# Patient Record
Sex: FEMALE | Race: WHITE | Employment: OTHER | URBAN - METROPOLITAN AREA
[De-identification: names, ages, dates, MRNs, and addresses within clinical notes are randomized per-mention and may not be internally consistent; named-entity substitution may affect disease eponyms.]

---

## 2017-01-03 ENCOUNTER — OFFICE VISIT (OUTPATIENT)
Dept: ONCOLOGY | Age: 77
End: 2017-01-03

## 2017-01-03 VITALS
RESPIRATION RATE: 18 BRPM | SYSTOLIC BLOOD PRESSURE: 155 MMHG | DIASTOLIC BLOOD PRESSURE: 58 MMHG | HEIGHT: 62 IN | WEIGHT: 251.6 LBS | HEART RATE: 71 BPM | OXYGEN SATURATION: 94 % | BODY MASS INDEX: 46.3 KG/M2 | TEMPERATURE: 96.6 F

## 2017-01-03 DIAGNOSIS — C54.1 ENDOMETRIAL CANCER (HCC): Primary | ICD-10-CM

## 2017-01-03 NOTE — MR AVS SNAPSHOT
Visit Information Date & Time Provider Department Dept. Phone Encounter #  
 1/3/2017 11:15 AM MD Jackie Lanza [facility] [phone] Your Appointments 5/16/2017 11:15 AM  
Office Visit with MD Jackie Lanza [facility] [address] [facility]) Appt Note: 4 mo f/u  
 One [address] Upcoming Health Maintenance Date Due HEMOGLOBIN A1C Q6M 1940 LIPID PANEL Q1 1940 FOOT EXAM Q1 3/8/1952 MICROALBUMIN Q1 3/8/1952 EYE EXAM RETINAL OR DILATED Q1 3/8/1952 DTaP/Tdap/Td series (1 - Tdap) 3/8/1963 FOBT Q 1 YEAR AGE 50-75 3/8/1992 ZOSTER VACCINE AGE 60> 3/8/2002 GLAUCOMA SCREENING Q2Y 3/8/2007 OSTEOPOROSIS SCREENING (DEXA) 3/8/2007 Pneumococcal 65+ High/Highest Risk (1 of 2 - PCV13) 3/8/2007 MEDICARE YEARLY EXAM 3/8/2007 INFLUENZA AGE 9 TO ADULT 8/1/2016 Allergies as of 1/3/2017  Review Complete On: 1/3/2017 By: Gina Stevenson MD  
  
 Severity Noted Reaction Type Reactions Sulfa (Sulfonamide Antibiotics)  06/18/2015    Rash Current Immunizations  Never Reviewed No immunizations on file. Not reviewed this visit You Were Diagnosed With   
  
 Codes Comments Endometrial cancer ([facility] [address]    -  Primary ICD-10-CM: C54.1 ICD-9-CM: 182. 0 Vitals BP Pulse Temp Resp Height(growth percentile) Weight(growth percentile) 155/58 (BP 1 Location: Right arm, BP Patient Position: Sitting) 71 96.6 °F (35.9 °C) (Oral) 18 5' 2\" (1.575 m) 251 lb 9.6 oz (114.1 kg) SpO2 BMI OB Status Smoking Status 94% 46.02 kg/m2 Hysterectomy Never Smoker BMI and BSA Data Body Mass Index Body Surface Area 46.02 kg/m 2 2.23 m 2 Preferred Pharmacy Pharmacy Name Phone [address] [facility]. [facility] [phone] Your Updated Medication List  
  
   
This list is accurate as of: 1/3/17 12:13 PM.  Always use your most recent med list.  
  
  
  
  
 atenolol 25 mg tablet Commonly known as:  TENORMIN Take 25 mg by mouth daily. CALTRATE 600 + D 600 mg (1,500 mg)-800 unit iMega Generic drug:  calcium carbonate-vitamin D3 Take  by Mouth Once a Day. CALTRATE 600+D PLUS MINERALS 600 mg calcium- 400 unit Tab Generic drug:  Calcium Carbonate-Vit D3-Min Take  by mouth. * VOLTAREN 1 % Gel Generic drug:  diclofenac * diclofenac EC 75 mg EC tablet Commonly known as:  VOLTAREN  
  
 furosemide 40 mg tablet Commonly known as:  LASIX  
  
 glimepiride 2 mg tablet Commonly known as:  AMARYL  
  
 hydroCHLOROthiazide 12.5 mg capsule Commonly known as:  MICROZIDE  
12.5 mg.  
  
 ketoconazole 2 % shampoo Commonly known as:  NIZORAL Apply  to affected area daily as needed for Itching. losartan-hydroCHLOROthiazide 100-25 mg per tablet Commonly known as:  HYZAAR  
  
 magnesium 250 mg Tab Take  by mouth.  
  
 metFORMIN 500 mg Tg24 24 hour tablet Commonly known asKelleen [place] [facility] Take  by mouth. omeprazole 20 mg capsule Commonly known as:  PRILOSEC  
  
 potassium chloride SR 10 mEq tablet Commonly known as:  KLOR-CON 10 Take 20 mEq by mouth two (2) times a day. PRENATAL GUMMY 400 mcg-35 mg -25 mg-5 mg Chew Generic drug:  PNV62-FA-om3-dha-epa-fish oil Take  by mouth. therapeutic multivitamin-minerals tablet Commonly known as:  THERAGRAN-M Take 1 Tab by Mouth Once a Day. For hair and nail * Notice: This list has 2 medication(s) that are the same as other medications prescribed for you. Read the directions carefully, and ask your doctor or other care provider to review them with you. Introducing [facility] & [facility]! Dear Presley Cleaves: Thank you for requesting a [employer] account. Our records indicate that you already have an active [employer] account. You can access your account anytime at [email or web address] Did you know that you can access your hospital and ER discharge instructions at any time in [employer]? You can also review all of your test results from your hospital stay or ER visit. Additional Information If you have questions, please visit the Frequently Asked Questions section of the [employer] website at [email or web address] Remember, NeoAccel is NOT to be used for urgent needs. For medical emergencies, dial 911. Now available from your iPhone and Android! Please provide this summary of care documentation to your next provider. Your primary care clinician is listed as Nicole Johnson. If you have any questions after today's visit, please call [phone].

## 2017-01-03 NOTE — PROGRESS NOTES
[facility]  One [facility], [address], [address] [facility]   [phone]  Parveen Fitzgerald DO      Patient ID:  Name: Franca Villarreal  MRM: [id number]  : 1940/76 y.o. Date: 1/3/2017    SUBJECTIVE:    This is a 78 y.o.   female with a diagnosis of Stage IAG1 Endometrial Cancer following a robotic assisted TLH and BSO with cystoscopy 7/2/15. Pt is here today for a 4 month f/u. Patient states that approximatley a week ago she had frequent urination and urgency that has subsided. Patient followed up with Dr. Leena Will but has yet to make a f/u appointment due to her  having to have surgery. Denies Vaginal bleeding, change in vaginal discharge, new abdominopelvic pain, change in bladder/bowel habits      Her pathology revealed: 7/2/15      Imaging:  CT 11/25/15  Impression:      1. There is a fat containing umbilical hernia measuring approximately 3.9 cm in diameter. This has a neck measuring 2.6 cm in diameter. There is no protrusion of bowel into this hernia. No other abdominal wall hernia is evident. 2. Fatty infiltration of the liver. 3. Status post cholecystectomy. 4. A nonobstructing stone is noted in the lower pole of the left renal collecting system. 5. Colonic diverticulosis without evidence of acute diverticulitis. 6. Status post hysterectomy and bilateral oophorectomies. 7. The appendix is not visualized and is also presumed surgically absent. Medications:     Current Outpatient Prescriptions on File Prior to Visit   Medication Sig Dispense Refill    calcium carbonate-vitamin D3 (CALTRATE 600 + D) 600 mg (1,500 mg)-800 unit chew Take  by Mouth Once a Day.  hydrochlorothiazide (MICROZIDE) 12.5 mg capsule 12.5 mg.      therapeutic multivitamin-minerals (THERAGRAN-M) tablet Take 1 Tab by Mouth Once a Day.  For hair and nail      glimepiride (AMARYL) 2 mg tablet   0    furosemide (LASIX) 40 mg tablet   0    PNV62-FA-om3-dha-epa-fish oil (PRENATAL GUMMY) 400 mcg-35 mg -25 mg-5 mg chew Take  by mouth.  Calcium Carbonate-Vit D3-Min (CALTRATE 600+D PLUS MINERALS) 600 mg calcium- 400 unit tab Take  by mouth.  diclofenac EC (VOLTAREN) 75 mg EC tablet   0    VOLTAREN 1 % topical gel   0    losartan-hydrochlorothiazide (HYZAAR) 100-25 mg per tablet   0    omeprazole (PRILOSEC) 20 mg capsule   0    atenolol (TENORMIN) 25 mg tablet Take 25 mg by mouth daily.  magnesium 250 mg tab Take  by mouth.  potassium chloride SR (KLOR-CON 10) 10 mEq tablet Take 20 mEq by mouth two (2) times a day.  metFORMIN (GLUMETZA ER) 500 mg TG24 24 hour tablet Take  by mouth.  ketoconazole (NIZORAL) 2 % shampoo Apply  to affected area daily as needed for Itching. No current facility-administered medications on file prior to visit. Allergies: Allergies   Allergen Reactions    Sulfa (Sulfonamide Antibiotics) Rash       OBJECTIVE:    Vitals:   Visit Vitals    /58 (BP 1 Location: Right arm, BP Patient Position: Sitting)    Pulse 71    Temp 96.6 °F (35.9 °C) (Oral)    Resp 18    Ht 5' 2\" (1.575 m)    Wt 114.1 kg (251 lb 9.6 oz)    SpO2 94%    BMI 46.02 kg/m2       Physical Examination:    General:  alert, cooperative, no distress   Abdomen: soft, bowel sounds active, non-tender   Incision: healing well, small bulge above umblicus   Pelvic: NEFG, Spec exam revealed vaginal cuff intact, BME revealed vaginal cuff intact, nontender   Rectal: not done   Extremity:   extremities normal, atraumatic, no cyanosis or edema     IMPRESSION/PLAN:  1. Stage IAG1 endometrial cancer, who is clinically NEREYDA   -reviewed signs/symptoms of recurrence   -reviewed surveillance strategy   -will plan for f/u in  4 months   -all of patients questions/concerns addressed    2.  Urinary Incontinence   -treated by dr. Janak Hughes   -encouraged her to schedule f/u          I will see the patient back in 4 month(s). The patient is advised to call our office with any problems or concerns. Total time spent was 25 minutes regarding diagnosis of endometrial cancer and >50% of this time was spent counseling and coordinating care.     Tiny Mail DO  Gynecologic Oncology  1/3/2017/10:03 AM

## 2017-05-16 ENCOUNTER — OFFICE VISIT (OUTPATIENT)
Dept: ONCOLOGY | Age: 77
End: 2017-05-16

## 2017-05-16 VITALS
SYSTOLIC BLOOD PRESSURE: 151 MMHG | OXYGEN SATURATION: 95 % | TEMPERATURE: 97.6 F | HEART RATE: 68 BPM | BODY MASS INDEX: 44.26 KG/M2 | RESPIRATION RATE: 18 BRPM | DIASTOLIC BLOOD PRESSURE: 66 MMHG | WEIGHT: 242 LBS

## 2017-05-16 DIAGNOSIS — Z85.42 HISTORY OF ENDOMETRIAL CANCER: Primary | ICD-10-CM

## 2017-05-16 RX ORDER — ALLOPURINOL 100 MG/1
300 TABLET ORAL DAILY
Refills: 0 | COMMUNITY
Start: 2017-04-24

## 2017-05-16 NOTE — PROGRESS NOTES
[facility]  [address] [place] [address], [address], [address] [place] [address] 261 [phone]  Guilherme Thompson DO      Patient ID:  Name: Brodie Morgan  MRM: [id number]  : 1940/77 y.o. Date: 2017    SUBJECTIVE:    This is a 78 y.o.   female with a diagnosis of Stage IAG1 Endometrial Cancer following a robotic assisted TLH and BSO with cystoscopy 7/2/15. Pt is here today for a 4 month f/u. Patient denies any Gyn symptoms. Patient specifically denies any abnormal vaginal bleeding, vaginal discharge, or vaginal irritation. Patient also denies abdominal pain, pelvic pain, or abdominal bloating. Patient is voiding without difficulty and bowel movements are regular. Recently diagnosed with kidney stone and admitted to hospital s/p stent placement        Her pathology revealed: 7/2/15      Imaging:  CT 11/25/15  Impression:      1. There is a fat containing umbilical hernia measuring approximately 3.9 cm in diameter. This has a neck measuring 2.6 cm in diameter. There is no protrusion of bowel into this hernia. No other abdominal wall hernia is evident. 2. Fatty infiltration of the liver. 3. Status post cholecystectomy. 4. A nonobstructing stone is noted in the lower pole of the left renal collecting system. 5. Colonic diverticulosis without evidence of acute diverticulitis. 6. Status post hysterectomy and bilateral oophorectomies. 7. The appendix is not visualized and is also presumed surgically absent. Medications:     Current Outpatient Prescriptions on File Prior to Visit   Medication Sig Dispense Refill    calcium carbonate-vitamin D3 (CALTRATE 600 + D) 600 mg (1,500 mg)-800 unit chew Take  by Mouth Once a Day.  hydrochlorothiazide (MICROZIDE) 12.5 mg capsule 12.5 mg.      therapeutic multivitamin-minerals (THERAGRAN-M) tablet Take 1 Tab by Mouth Once a Day.  For hair and nail      glimepiride (AMARYL) 2 mg tablet   0    furosemide (LASIX) 40 mg tablet   0    PNV62-FA-om3-dha-epa-fish oil (PRENATAL GUMMY) 400 mcg-35 mg -25 mg-5 mg chew Take  by mouth.  Calcium Carbonate-Vit D3-Min (CALTRATE 600+D PLUS MINERALS) 600 mg calcium- 400 unit tab Take  by mouth.  diclofenac EC (VOLTAREN) 75 mg EC tablet   0    VOLTAREN 1 % topical gel   0    losartan-hydrochlorothiazide (HYZAAR) 100-25 mg per tablet   0    omeprazole (PRILOSEC) 20 mg capsule   0    atenolol (TENORMIN) 25 mg tablet Take 25 mg by mouth daily.  magnesium 250 mg tab Take  by mouth.  potassium chloride SR (KLOR-CON 10) 10 mEq tablet Take 20 mEq by mouth two (2) times a day.  metFORMIN (GLUMETZA ER) 500 mg TG24 24 hour tablet Take  by mouth.  ketoconazole (NIZORAL) 2 % shampoo Apply  to affected area daily as needed for Itching. No current facility-administered medications on file prior to visit. Allergies: Allergies   Allergen Reactions    Sulfa (Sulfonamide Antibiotics) Rash       OBJECTIVE:    Vitals: There were no vitals taken for this visit. Physical Examination:    General:  alert, cooperative, no distress   Abdomen: soft, bowel sounds active, non-tender   Incision: healing well, small bulge above umblicus   Pelvic: NEFG, Spec exam revealed vaginal cuff intact, BME revealed vaginal cuff intact, nontender   Rectal: not done   Extremity:   extremities normal, atraumatic, no cyanosis or edema     IMPRESSION/PLAN:  1. Stage IAG1 endometrial cancer, who is clinically NEREYDA   -reviewed signs/symptoms of recurrence   -reviewed surveillance strategy   -will plan for f/u in  6 months   -all of patients questions/concerns addressed    2. Urinary Incontinence   -has f/u with urology              Total time spent was 25 minutes regarding diagnosis of endometrial cancer and >50% of this time was spent counseling and coordinating care.     Daljit Espino DO  Gynecologic Oncology  5/16/2017/10:03 AM

## 2017-05-16 NOTE — MR AVS SNAPSHOT
Visit Information Date & Time Provider Department Dept. Phone Encounter #  
 5/16/2017 11:15 AM MD King Ketan Hernandez [facility] [phone] [id number] Your Appointments 11/28/2017  9:45 AM  
Office Visit with MD King Ketan Hernandez [facility]) Appt Note: 6 mo f/u  
 [address] [facility] [address] Upcoming Health Maintenance Date Due HEMOGLOBIN A1C Q6M 1940 LIPID PANEL Q1 1942 FOOT EXAM Q1 3/8/1952 MICROALBUMIN Q1 3/8/1952 EYE EXAM RETINAL OR DILATED Q1 3/8/1952 DTaP/Tdap/Td series (1 - Tdap) 3/8/1963 ZOSTER VACCINE AGE 60> 3/8/2002 GLAUCOMA SCREENING Q2Y 3/8/2007 OSTEOPOROSIS SCREENING (DEXA) 3/8/2007 Pneumococcal 65+ High/Highest Risk (1 of 2 - PCV13) 3/8/2007 MEDICARE YEARLY EXAM 3/8/2007 INFLUENZA AGE 9 TO ADULT 8/1/2017 Allergies as of 5/16/2017  Review Complete On: 5/16/2017 By: Mariana Jimenez MD  
  
 Severity Noted Reaction Type Reactions Sulfa (Sulfonamide Antibiotics)  06/18/2015    Rash Current Immunizations  Never Reviewed No immunizations on file. Not reviewed this visit You Were Diagnosed With   
  
 Codes Comments History of endometrial cancer    -  Primary ICD-10-CM: Z85.42 
ICD-9-CM: V10.42 Vitals BP Pulse Temp Resp Weight(growth percentile) SpO2  
 151/66 68 97.6 °F (36.4 °C) (Oral) 18 242 lb (109.8 kg) 95% BMI OB Status Smoking Status 44.26 kg/m2 Hysterectomy Never Smoker BMI and BSA Data Body Mass Index Body Surface Area  
 44.26 kg/m 2 2.19 m 2 Preferred Pharmacy Pharmacy Name Phone [address] [facility] 86. [facility] [phone] Your Updated Medication List  
  
   
 This list is accurate as of: 5/16/17 11:53 AM.  Always use your most recent med list.  
  
  
  
  
 allopurinol 100 mg tablet Commonly known as:  ZYLOPRIM  
100 mg.  
  
 atenolol 25 mg tablet Commonly known as:  TENORMIN Take 25 mg by mouth daily. CALTRATE 600 + D 600 mg (1,500 mg)-800 unit [facility] Generic drug:  calcium carbonate-vitamin D3 Take  by Mouth Once a Day. CALTRATE 600+D PLUS MINERALS 600 mg calcium- 400 unit Tab Generic drug:  Calcium Carbonate-Vit D3-Min Take  by mouth. * VOLTAREN 1 % Gel Generic drug:  diclofenac * diclofenac EC 75 mg EC tablet Commonly known as:  VOLTAREN  
  
 furosemide 40 mg tablet Commonly known as:  LASIX  
  
 glimepiride 2 mg tablet Commonly known as:  AMARYL  
  
 hydroCHLOROthiazide 12.5 mg capsule Commonly known as:  MICROZIDE  
12.5 mg.  
  
 ketoconazole 2 % shampoo Commonly known as:  NIZORAL Apply  to affected area daily as needed for Itching. losartan-hydroCHLOROthiazide 100-25 mg per tablet Commonly known as:  HYZAAR  
  
 magnesium 250 mg Tab Take  by mouth.  
  
 metFORMIN 500 mg Tg24 24 hour tablet Commonly known asEugenia William ER Take  by mouth. omeprazole 20 mg capsule Commonly known as:  PRILOSEC  
  
 potassium chloride SR 10 mEq tablet Commonly known as:  KLOR-CON 10 Take 20 mEq by mouth two (2) times a day. PRENATAL GUMMY 400 mcg-35 mg -25 mg-5 mg Chew Generic drug:  PNV62-FA-om3-dha-epa-fish oil Take  by mouth. therapeutic multivitamin-minerals tablet Commonly known as:  THERAGRAN-M Take 1 Tab by Mouth Once a Day. For hair and nail * Notice: This list has 2 medication(s) that are the same as other medications prescribed for you. Read the directions carefully, and ask your doctor or other care provider to review them with you. Introducing [facility]! Dear Oseas Rosenberg: Thank you for requesting a [employer] account.   Our records indicate that you already have an active [employer] account. You can access your account anytime at [email or web address] Did you know that you can access your hospital and ER discharge instructions at any time in [employer]? You can also review all of your test results from your hospital stay or ER visit. Additional Information If you have questions, please visit the Frequently Asked Questions section of the [employer] website at [email or web address] Remember, [employer] is NOT to be used for urgent needs. For medical emergencies, dial 911. Now available from your iPhone and Android! Please provide this summary of care documentation to your next provider. Your primary care clinician is listed as Renata Kearney. If you have any questions after today's visit, please call [phone].

## 2017-11-28 ENCOUNTER — OFFICE VISIT (OUTPATIENT)
Dept: ONCOLOGY | Age: 77
End: 2017-11-28

## 2017-11-28 VITALS
WEIGHT: 244.2 LBS | HEART RATE: 63 BPM | BODY MASS INDEX: 44.94 KG/M2 | TEMPERATURE: 96.8 F | RESPIRATION RATE: 16 BRPM | SYSTOLIC BLOOD PRESSURE: 163 MMHG | OXYGEN SATURATION: 98 % | HEIGHT: 62 IN | DIASTOLIC BLOOD PRESSURE: 69 MMHG

## 2017-11-28 DIAGNOSIS — Z85.42 HISTORY OF ENDOMETRIAL CANCER: Primary | ICD-10-CM

## 2017-11-28 RX ORDER — ASPIRIN 81 MG/1
TABLET ORAL AS NEEDED
COMMUNITY

## 2017-11-28 NOTE — PROGRESS NOTES
Devon Estrada, a 78 y.o. female,  is here for   Chief Complaint   Patient presents with    Uterine Cancer     surveillance       Visit Vitals    /69 (BP 1 Location: Right arm, BP Patient Position: Sitting)    Pulse 63    Temp 96.8 °F (36 °C) (Oral)    Resp 16    Ht 5' 2\" (1.575 m)    Wt 110.8 kg (244 lb 3.2 oz)    SpO2 98%    BMI 44.66 kg/m2       Patient reports having intermittent lower abdominal pain, rated at a 3, described as annoying rather than pain. Patient also reports that 2 weeks ago she had a feeling in her pelvis that felt like she was going to begin a menstrual cycle. States it's the same pain she felt before intermittently before being diagnosed with endometrial cancer. Denies any discharge, bleeding, or irritation. No changes in bladder or bowel habits. 1. Have you been to the ER, urgent care clinic since your last visit? Hospitalized since your last visit? No    2. Have you seen or consulted any other health care providers outside of the [address] [place] [address] since your last visit? Include any pap smears or colon screening.  Yes Where: Dr. Gayle Escobar Reason for visit: following up (kidney stones)

## 2017-11-28 NOTE — PROGRESS NOTES
[facility]  [address], [address], [address] [place] [address] 261 [phone]  Jonn Carter DO      Patient ID:  Name: Antonio Botello  MRM: [id number]  : 1940/77 y.o. Date: 2017    SUBJECTIVE:    This is a 78 y.o.   female with a diagnosis of Stage IAG1 Endometrial Cancer following a robotic assisted TLH and BSO with cystoscopy 7/2/15. Pt is here today for a 6 month f/u. Patient denies any Gyn symptoms. Patient specifically denies any abnormal vaginal bleeding, vaginal discharge, or vaginal irritation. Patient also denies abdominal pain, pelvic pain, or abdominal bloating. Patient is voiding without difficulty and bowel movements are regular. Has a small \"twinge\" in right side of abdomen. Her pathology revealed: 7/2/15      Imaging:  CT 11/25/15  Impression:      1. There is a fat containing umbilical hernia measuring approximately 3.9 cm in diameter. This has a neck measuring 2.6 cm in diameter. There is no protrusion of bowel into this hernia. No other abdominal wall hernia is evident. 2. Fatty infiltration of the liver. 3. Status post cholecystectomy. 4. A nonobstructing stone is noted in the lower pole of the left renal collecting system. 5. Colonic diverticulosis without evidence of acute diverticulitis. 6. Status post hysterectomy and bilateral oophorectomies. 7. The appendix is not visualized and is also presumed surgically absent. Medications:     Current Outpatient Prescriptions on File Prior to Visit   Medication Sig Dispense Refill    allopurinol (ZYLOPRIM) 100 mg tablet 100 mg.  0    hydrochlorothiazide (MICROZIDE) 12.5 mg capsule 12.5 mg.      therapeutic multivitamin-minerals (THERAGRAN-M) tablet Take 1 Tab by Mouth Once a Day.  For hair and nail      glimepiride (AMARYL) 2 mg tablet   0    furosemide (LASIX) 40 mg tablet   0    PNV62-FA-om3-dha-epa-fish oil (PRENATAL GUMMY) 400 mcg-35 mg -25 mg-5 mg chew Take  by mouth.  Calcium Carbonate-Vit D3-Min (CALTRATE 600+D PLUS MINERALS) 600 mg calcium- 400 unit tab Take  by mouth.  diclofenac EC (VOLTAREN) 75 mg EC tablet   0    VOLTAREN 1 % topical gel   0    losartan-hydrochlorothiazide (HYZAAR) 100-25 mg per tablet   0    omeprazole (PRILOSEC) 20 mg capsule   0    atenolol (TENORMIN) 25 mg tablet Take 25 mg by mouth daily.  magnesium 250 mg tab Take  by mouth.  potassium chloride SR (KLOR-CON 10) 10 mEq tablet Take 20 mEq by mouth two (2) times a day.  metFORMIN (GLUMETZA ER) 500 mg TG24 24 hour tablet Take  by mouth.  ketoconazole (NIZORAL) 2 % shampoo Apply  to affected area daily as needed for Itching.  calcium carbonate-vitamin D3 (CALTRATE 600 + D) 600 mg (1,500 mg)-800 unit chew Take  by Mouth Once a Day. No current facility-administered medications on file prior to visit. Allergies: Allergies   Allergen Reactions    Sulfa (Sulfonamide Antibiotics) Rash       OBJECTIVE:    Vitals:   Visit Vitals    /69 (BP 1 Location: Right arm, BP Patient Position: Sitting)    Pulse 63    Temp 96.8 °F (36 °C) (Oral)    Resp 16    Ht 5' 2\" (1.575 m)    Wt 110.8 kg (244 lb 3.2 oz)    LMP  (LMP Unknown)    SpO2 98%    BMI 44.66 kg/m2       Physical Examination:    General:  alert, cooperative, no distress   Abdomen: soft, bowel sounds active, non-tender   Incision: healing well, small bulge above umblicus   Pelvic: NEFG, Spec exam revealed vaginal cuff intact, BME revealed vaginal cuff intact, nontender   Rectal: not done   Extremity:   extremities normal, atraumatic, no cyanosis or edema     IMPRESSION/PLAN:  1. Stage IAG1 endometrial cancer, who is clinically NEREYDA   -reviewed signs/symptoms of recurrence   -reviewed surveillance strategy   -will plan for f/u in  6 months   -all of patients questions/concerns addressed    2.  Urinary Incontinence   -improved; following with urology              Total time spent was 25 minutes regarding diagnosis of endometrial cancer and >50% of this time was spent counseling and coordinating care.     uSjey Whittington DO  Gynecologic Oncology  11/28/2017/10:03 AM

## 2018-05-03 ENCOUNTER — TELEPHONE (OUTPATIENT)
Dept: ONCOLOGY | Age: 78
End: 2018-05-03

## 2018-05-29 ENCOUNTER — OFFICE VISIT (OUTPATIENT)
Dept: ONCOLOGY | Age: 78
End: 2018-05-29

## 2018-05-29 VITALS
BODY MASS INDEX: 44.09 KG/M2 | TEMPERATURE: 97.7 F | HEIGHT: 62 IN | RESPIRATION RATE: 20 BRPM | WEIGHT: 239.6 LBS | OXYGEN SATURATION: 97 % | DIASTOLIC BLOOD PRESSURE: 68 MMHG | HEART RATE: 66 BPM | SYSTOLIC BLOOD PRESSURE: 142 MMHG

## 2018-05-29 DIAGNOSIS — Z85.42 HISTORY OF ENDOMETRIAL CANCER: Primary | ICD-10-CM

## 2018-05-29 NOTE — PATIENT INSTRUCTIONS
Health Maintenance Due   Topic Date Due    HEMOGLOBIN A1C Q6M  1940    LIPID PANEL Q1  1940    FOOT EXAM Q1  03/08/1952    MICROALBUMIN Q1  03/08/1952    EYE EXAM RETINAL OR DILATED Q1  03/08/1952    DTaP/Tdap/Td series (1 - Tdap) 03/08/1963    ZOSTER VACCINE AGE 60>  01/08/2002    GLAUCOMA SCREENING Q2Y  03/08/2007    Bone Densitometry (Dexa) Screening  03/08/2007    Pneumococcal 65+ High/Highest Risk (1 of 2 - PCV13) 03/08/2007        Below you will find information on the condition you were previously diagnosed with. Please call the office as soon as possible if you begin to experience the following symptoms: Persistent or worsening abdominal or pelvic pain, any unusual vaginal bleeding, discharge, odor, or irritation, and any changes in bladder or bowel habits such as constipation, diarrhea, burning, or general discomfort. Please call the office if you have any other questions or concerns. Uterine Cancer: Care Instructions  Your Care Instructions  Uterine cancer is the rapid growth of abnormal cells that line the uterus. It also is called endometrial cancer. These cells may spread to nearby organs, lymph glands, or distant organs. Uterine cancer can be cured most often when found early. Treatment may include surgery to remove the uterus, ovaries, fallopian tubes, and sometimes the pelvic lymph nodes. Radiation and hormones to stop cancer growth also are sometimes used. Chemotherapy may be used if the cancer has spread. Having cancer can be scary. You may feel many emotions and may need some help coping. Seek out family, friends, and counselors for support. You can do things at home to make yourself feel better while you go through treatment. You also can call the [employer] (0-268.668.6144) or visit its website at [address] [employer] Wallet for more information. Follow-up care is a key part of your treatment and safety.  Be sure to make and go to all appointments, and call your doctor if you are having problems. It's also a good idea to know your test results and keep a list of the medicines you take. How can you care for yourself at home? · Take your medicines exactly as prescribed. Call your doctor if you think you are having a problem with your medicine. You may get medicine for nausea and vomiting if you have these side effects. · Eat healthy food. If you do not feel like eating, try to eat food that has protein and extra calories to keep up your strength and prevent weight loss. Drink liquid meal replacements for extra calories and protein. Try to eat your main meal early. · Get some physical activity every day, but do not get too tired. Keep doing the hobbies you enjoy as your energy allows. · Take steps to control your stress and workload. Learn relaxation techniques. ¨ Share your feelings. Stress and tension affect our emotions. By expressing your feelings to others, you may be able to understand and cope with them. ¨ Consider joining a support group. Talking about a problem with your spouse, a good friend, or other people with similar problems is a good way to reduce tension and stress. ¨ Express yourself through art. Try writing, dance, art, or crafts to relieve tension. Some dance, writing, or art groups may be available just for people who have cancer. ¨ Be kind to your body and mind. Getting enough sleep, eating a healthy diet, and taking time to do things you enjoy can contribute to an overall feeling of balance in your life and help reduce stress. ¨ Get help if you need it. Discuss your concerns with your doctor or counselor. · If you are vomiting or have diarrhea:  ¨ Drink plenty of fluids (enough so that your urine is light yellow or clear like water) to prevent dehydration. Choose water and other caffeine-free clear liquids.  If you have kidney, heart, or liver disease and have to limit fluids, talk with your doctor before you increase the amount of fluids you drink.  ¨ When you are able to eat, try clear soups, mild foods, and liquids until all symptoms are gone for 12 to 48 hours. Other good choices include dry toast, crackers, cooked cereal, and gelatin dessert, such as Jell-O.  · Take care of your urinary tract to prevent problems such as infection, which can be caused by uterine cancer and its treatment. Limit drinks with caffeine, drink plenty of fluids, and urinate every 3 to 4 hours. · If you have not already done so, prepare a list of advance directives. Advance directives are instructions to your doctor and family members about what kind of care you want if you become unable to speak or express yourself. When should you call for help? Call 911 anytime you think you may need emergency care. For example, call if:  ? · You passed out (lost consciousness). ?Call your doctor now or seek immediate medical care if:  ? · You have a fever. ? · You have abnormal bleeding. ? · You have new or worse pain. ? · You think you have an infection. ? · You have new symptoms, such as a cough, belly pain, vomiting, diarrhea, or a rash. ? Watch closely for changes in your health, and be sure to contact your doctor if:  ? · You are much more tired than usual.   ? · You have swollen glands in your armpits, groin, or neck. ? · You do not get better as expected. Where can you learn more? Go to http://laura-krystina.info/. Enter E343 in the search box to learn more about \"Uterine Cancer: Care Instructions. \"  Current as of: May 12, 2017  Content Version: 11.4  © 1140-7671 [employer]. Care instructions adapted under license by [employer] (which disclaims liability or warranty for this information). If you have questions about a medical condition or this instruction, always ask your healthcare professional. [address] any warranty or liability for your use of this information.

## 2018-05-29 NOTE — PROGRESS NOTES
Tanmay Sanchez, a 70 y.o. female,  is here for   Chief Complaint   Patient presents with    Uterine Cancer     6 month surveillance       Visit Vitals    /68 (BP 1 Location: Right arm, BP Patient Position: Sitting)    Pulse 66    Temp 97.7 °F (36.5 °C) (Oral)    Resp 20    Ht 5' 2\" (1.575 m)    Wt 108.7 kg (239 lb 9.6 oz)    SpO2 97%    BMI 43.82 kg/m2       Patient reports that she is experiencing frequent diarrhea, currently following up with her PCP and a GI doctor who's name she cannot remember. Patient denies any persistent or worsening abdominal or pelvic pain. Denies any unusual vaginal bleeding, discharge, irritation, or odor. Denies experiencing any constipation or diarrhea. No burning, discomfort, or irritation with urination. 1. Have you been to the ER, urgent care clinic since your last visit? Hospitalized since your last visit? No    2. Have you seen or consulted any other health care providers outside of the [address] [place] [address] since your last visit? Include any pap smears or colon screening.  Yes Where: PCP and GI Reason for visit: diarrhea

## 2018-05-29 NOTE — MR AVS SNAPSHOT
[address] [facility] 
 
 
 [facility] [address] [place] [address] 
[phone] Patient: Nina Washington MRN: [id number] QPK:[id number] Visit Information Date & Time Provider Department Dept. Phone Encounter #  
 5/29/2018  1:30 PM [facility] [phone] Your Appointments 11/27/2018 10:00 AM  
Office Visit with Marilu Due, MD [facility] Appt Note: 6 m f/u  
 One [address] Upcoming Health Maintenance Date Due HEMOGLOBIN A1C Q6M 1940 LIPID PANEL Q1 1940 FOOT EXAM Q1 3/8/1952 MICROALBUMIN Q1 3/8/1952 EYE EXAM RETINAL OR DILATED Q1 3/8/1952 DTaP/Tdap/Td series (1 - Tdap) 3/8/1963 ZOSTER VACCINE AGE 60> 1/8/2002 GLAUCOMA SCREENING Q2Y 3/8/2007 Bone Densitometry (Dexa) Screening 3/8/2007 Pneumococcal 65+ High/Highest Risk (1 of 2 - PCV13) 3/8/2007 Influenza Age 5 to Adult 8/1/2018 MEDICARE YEARLY EXAM 4/3/2019 Allergies as of 5/29/2018  Review Complete On: 5/29/2018 By: Hilary Lentz MD  
  
 Severity Noted Reaction Type Reactions Sulfa (Sulfonamide Antibiotics)  06/18/2015    Rash Current Immunizations  Never Reviewed No immunizations on file. Not reviewed this visit You Were Diagnosed With   
  
 Codes Comments History of endometrial cancer    -  Primary ICD-10-CM: Z85.42 
ICD-9-CM: V10.42 Vitals BP Pulse Temp Resp Height(growth percentile) Weight(growth percentile) 142/68 (BP 1 Location: Right arm, BP Patient Position: Sitting) 66 97.7 °F (36.5 °C) (Oral) 20 5' 2\" (1.575 m) 239 lb 9.6 oz (108.7 kg) LMP SpO2 BMI OB Status Smoking Status (LMP Unknown) 97% 43.82 kg/m2 Hysterectomy Never Smoker BMI and BSA Data Body Mass Index Body Surface Area  
 43.82 kg/m 2 2.18 m 2 Preferred Pharmacy Pharmacy Name Phone [address] [phone] Your Updated Medication List  
  
   
This list is accurate as of 5/29/18  2:45 PM.  Always use your most recent med list.  
  
  
  
  
 allopurinol 100 mg tablet Commonly known as:  ZYLOPRIM  
100 mg.  
  
 aspirin delayed-release 81 mg tablet Take  by mouth as needed for Pain. atenolol 25 mg tablet Commonly known as:  TENORMIN Take 25 mg by mouth daily. CALTRATE 600 + D 600 mg (1,500 mg)-800 unit Doylene Barnes Generic drug:  calcium carbonate-vitamin D3 Take  by Mouth Once a Day. CALTRATE 600+D PLUS MINERALS 600 mg calcium- 400 unit Tab Generic drug:  Calcium Carbonate-Vit D3-Min Take  by mouth. * VOLTAREN 1 % Gel Generic drug:  diclofenac * diclofenac EC 75 mg EC tablet Commonly known as:  VOLTAREN  
  
 furosemide 40 mg tablet Commonly known as:  LASIX  
  
 glimepiride 2 mg tablet Commonly known as:  AMARYL  
  
 hydroCHLOROthiazide 12.5 mg capsule Commonly known as:  MICROZIDE  
12.5 mg.  
  
 ketoconazole 2 % shampoo Commonly known as:  NIZORAL Apply  to affected area daily as needed for Itching. losartan-hydroCHLOROthiazide 100-25 mg per tablet Commonly known as:  HYZAAR  
  
 magnesium 250 mg Tab Take  by mouth.  
  
 metFORMIN 500 mg Tg24 24 hour tablet Commonly known asOctavia Setters ER Take  by mouth. omeprazole 20 mg capsule Commonly known as:  PRILOSEC  
  
 potassium chloride SR 10 mEq tablet Commonly known as:  KLOR-CON 10 Take 20 mEq by mouth two (2) times a day. PRENATAL GUMMY 400 mcg-35 mg -25 mg-5 mg Chew Generic drug:  PNV62-FA-om3-dha-epa-fish oil Take  by mouth. therapeutic multivitamin-minerals tablet Commonly known as:  THERAGRAN-M Take 1 Tab by Mouth Once a Day. For hair and nail * Notice: This list has 2 medication(s) that are the same as other medications prescribed for you. Read the directions carefully, and ask your doctor or other care provider to review them with you. Patient Instructions Health Maintenance Due Topic Date Due  
 HEMOGLOBIN A1C Q6M  1940  LIPID PANEL Q1  1940  
 FOOT EXAM Q1  03/08/1952  MICROALBUMIN Q1  03/08/1952  
 EYE EXAM RETINAL OR DILATED Q1  03/08/1952  DTaP/Tdap/Td series (1 - Tdap) 03/08/1963  ZOSTER VACCINE AGE 60>  01/08/2002  GLAUCOMA SCREENING Q2Y  03/08/2007  Bone Densitometry (Dexa) Screening  03/08/2007  Pneumococcal 65+ High/Highest Risk (1 of 2 - PCV13) 03/08/2007 Below you will find information on the condition you were previously diagnosed with. Please call the office as soon as possible if you begin to experience the following symptoms: Persistent or worsening abdominal or pelvic pain, any unusual vaginal bleeding, discharge, odor, or irritation, and any changes in bladder or bowel habits such as constipation, diarrhea, burning, or general discomfort. Please call the office if you have any other questions or concerns. Uterine Cancer: Care Instructions Your Care Instructions Uterine cancer is the rapid growth of abnormal cells that line the uterus. It also is called endometrial cancer. These cells may spread to nearby organs, lymph glands, or distant organs. Uterine cancer can be cured most often when found early. Treatment may include surgery to remove the uterus, ovaries, fallopian tubes, and sometimes the pelvic lymph nodes. Radiation and hormones to stop cancer growth also are sometimes used. Chemotherapy may be used if the cancer has spread. Having cancer can be scary. You may feel many emotions and may need some help coping. Seek out family, friends, and counselors for support.  You can do things at home to make yourself feel better while you go through treatment. You also can call the Jack[facility] (7-843.858.8928) or visit its website at [address] for more information. Follow-up care is a key part of your treatment and safety. Be sure to make and go to all appointments, and call your doctor if you are having problems. It's also a good idea to know your test results and keep a list of the medicines you take. How can you care for yourself at home? · Take your medicines exactly as prescribed. Call your doctor if you think you are having a problem with your medicine. You may get medicine for nausea and vomiting if you have these side effects. · Eat healthy food. If you do not feel like eating, try to eat food that has protein and extra calories to keep up your strength and prevent weight loss. Drink liquid meal replacements for extra calories and protein. Try to eat your main meal early. · Get some physical activity every day, but do not get too tired. Keep doing the hobbies you enjoy as your energy allows. · Take steps to control your stress and workload. Learn relaxation techniques. ¨ Share your feelings. Stress and tension affect our emotions. By expressing your feelings to others, you may be able to understand and cope with them. ¨ Consider joining a support group. Talking about a problem with your spouse, a good friend, or other people with similar problems is a good way to reduce tension and stress. ¨ Express yourself through art. Try writing, dance, art, or crafts to relieve tension. Some dance, writing, or art groups may be available just for people who have cancer. ¨ Be kind to your body and mind. Getting enough sleep, eating a healthy diet, and taking time to do things you enjoy can contribute to an overall feeling of balance in your life and help reduce stress. ¨ Get help if you need it. Discuss your concerns with your doctor or counselor. · If you are vomiting or have diarrhea: ¨ Drink plenty of fluids (enough so that your urine is light yellow or clear like water) to prevent dehydration. Choose water and other caffeine-free clear liquids. If you have kidney, heart, or liver disease and have to limit fluids, talk with your doctor before you increase the amount of fluids you drink. ¨ When you are able to eat, try clear soups, mild foods, and liquids until all symptoms are gone for 12 to 48 hours. Other good choices include dry toast, crackers, cooked cereal, and gelatin dessert, such as Jell-O. 
· Take care of your urinary tract to prevent problems such as infection, which can be caused by uterine cancer and its treatment. Limit drinks with caffeine, drink plenty of fluids, and urinate every 3 to 4 hours. · If you have not already done so, prepare a list of advance directives. Advance directives are instructions to your doctor and family members about what kind of care you want if you become unable to speak or express yourself. When should you call for help? Call 911 anytime you think you may need emergency care. For example, call if: 
? · You passed out (lost consciousness). ?Call your doctor now or seek immediate medical care if: 
? · You have a fever. ? · You have abnormal bleeding. ? · You have new or worse pain. ? · You think you have an infection. ? · You have new symptoms, such as a cough, belly pain, vomiting, diarrhea, or a rash. ? Watch closely for changes in your health, and be sure to contact your doctor if: 
? · You are much more tired than usual.  
? · You have swollen glands in your armpits, groin, or neck. ? · You do not get better as expected. Where can you learn more? Go to http://laura-krystina.info/. Enter E343 in the search box to learn more about \"Uterine Cancer: Care Instructions. \" Current as of: May 12, 2017 Content Version: 11.4 © 5161-0630 [employer]wise[employer].  Care instructions adapted under license by [address] (which disclaims liability or warranty for this information). If you have questions about a medical condition or this instruction, always ask your healthcare professional. [address] any warranty or liability for your use of this information. Introducing \[facility] [place] [facility]\"" & HEALTH SERVICES! Dear Kathy Villanueva: Thank you for requesting a Perkle account. Our records indicate that you already have an active Perkle account. You can access your account anytime at [email or web address] Did you know that you can access your hospital and ER discharge instructions at any time in Perkle? You can also review all of your test results from your hospital stay or ER visit. Additional Information If you have questions, please visit the Frequently Asked Questions section of the Perkle website at [email or web address] Remember, Perkle is NOT to be used for urgent needs. For medical emergencies, dial 911. Now available from your iPhone and Android! Please provide this summary of care documentation to your next provider. Your primary care clinician is listed as Anjali Harris. If you have any questions after today's visit, please call [phone].

## 2018-05-29 NOTE — PROGRESS NOTES
[facility]  [address], [facility] [address], [address] [phone]  Nima Campbell DO      Patient ID:  Name: Eileen Rao  MRM: [id number]  : 1940/78 y.o. Date: 2018    SUBJECTIVE:    This is a 70 y.o.   female with a diagnosis of Stage IAG1 Endometrial Cancer following a robotic assisted TLH and BSO with cystoscopy 7/2/15. Pt is here today for a 6 month f/u. Patient denies any Gyn symptoms. Patient specifically denies any abnormal vaginal bleeding, vaginal discharge, or vaginal irritation. Patient also denies abdominal pain, pelvic pain, or abdominal bloating. Patient is voiding without difficulty and bowel movements are regular. Her pathology revealed: 7/2/15      Imaging:  CT 11/25/15  Impression:      1. There is a fat containing umbilical hernia measuring approximately 3.9 cm in diameter. This has a neck measuring 2.6 cm in diameter. There is no protrusion of bowel into this hernia. No other abdominal wall hernia is evident. 2. Fatty infiltration of the liver. 3. Status post cholecystectomy. 4. A nonobstructing stone is noted in the lower pole of the left renal collecting system. 5. Colonic diverticulosis without evidence of acute diverticulitis. 6. Status post hysterectomy and bilateral oophorectomies. 7. The appendix is not visualized and is also presumed surgically absent. Medications:     Current Outpatient Prescriptions on File Prior to Visit   Medication Sig Dispense Refill    aspirin delayed-release 81 mg tablet Take  by mouth as needed for Pain.  allopurinol (ZYLOPRIM) 100 mg tablet 100 mg.  0    calcium carbonate-vitamin D3 (CALTRATE 600 + D) 600 mg (1,500 mg)-800 unit chew Take  by Mouth Once a Day.       hydrochlorothiazide (MICROZIDE) 12.5 mg capsule 12.5 mg.      glimepiride (AMARYL) 2 mg tablet   0    furosemide (LASIX) 40 mg tablet   0  PNV62-FA-om3-dha-epa-fish oil (PRENATAL GUMMY) 400 mcg-35 mg -25 mg-5 mg chew Take  by mouth.  diclofenac EC (VOLTAREN) 75 mg EC tablet   0    VOLTAREN 1 % topical gel   0    losartan-hydrochlorothiazide (HYZAAR) 100-25 mg per tablet   0    omeprazole (PRILOSEC) 20 mg capsule   0    atenolol (TENORMIN) 25 mg tablet Take 25 mg by mouth daily.  potassium chloride SR (KLOR-CON 10) 10 mEq tablet Take 20 mEq by mouth two (2) times a day.  metFORMIN (GLUMETZA ER) 500 mg TG24 24 hour tablet Take  by mouth.  ketoconazole (NIZORAL) 2 % shampoo Apply  to affected area daily as needed for Itching.  therapeutic multivitamin-minerals (THERAGRAN-M) tablet Take 1 Tab by Mouth Once a Day. For hair and nail      Calcium Carbonate-Vit D3-Min (CALTRATE 600+D PLUS MINERALS) 600 mg calcium- 400 unit tab Take  by mouth.  magnesium 250 mg tab Take  by mouth. No current facility-administered medications on file prior to visit. Allergies: Allergies   Allergen Reactions    Sulfa (Sulfonamide Antibiotics) Rash       OBJECTIVE:    Vitals:   Visit Vitals    LMP  (LMP Unknown)       Physical Examination:    General:  alert, cooperative, no distress   Abdomen: soft, bowel sounds active, non-tender   Incision: healing well, small bulge above umblicus   Pelvic: NEFG, Spec exam revealed vaginal cuff intact, BME revealed vaginal cuff intact, nontender   Rectal: not done   Extremity:   extremities normal, atraumatic, no cyanosis or edema     IMPRESSION/PLAN:  1. Stage IAG1 endometrial cancer, who is clinically NEREYDA   -reviewed signs/symptoms of recurrence   -reviewed surveillance strategy   -will plan for f/u in  6 months   -all of patients questions/concerns addressed    2. Urinary Incontinence   -improved; following with urology              Total time spent was 25 minutes regarding diagnosis of endometrial cancer and >50% of this time was spent counseling and coordinating care.     Ni Ordaz [address] [facility]  Gynecologic Oncology  5/29/2018/10:03 AM

## 2018-08-16 ENCOUNTER — HOSPITAL ENCOUNTER (OUTPATIENT)
Dept: GENERAL RADIOLOGY | Age: 78
End: 2018-08-16
Attending: ORTHOPAEDIC SURGERY
Payer: MEDICARE

## 2018-08-16 ENCOUNTER — HOSPITAL ENCOUNTER (OUTPATIENT)
Dept: PREADMISSION TESTING | Age: 78
Discharge: HOME | End: 2018-08-16
Payer: MEDICARE

## 2018-08-16 ENCOUNTER — HOSPITAL ENCOUNTER (OUTPATIENT)
Dept: GENERAL RADIOLOGY | Age: 78
Discharge: HOME | End: 2018-08-16
Payer: MEDICARE

## 2018-08-16 VITALS — BODY MASS INDEX: 44.53 KG/M2 | HEIGHT: 62 IN | WEIGHT: 242 LBS

## 2018-08-16 LAB
ALBUMIN SERPL-MCNC: 3.6 G/DL (ref 3.4–5)
ALBUMIN/GLOB SERPL: 1.2 {RATIO} (ref 0.8–1.7)
ALP SERPL-CCNC: 88 U/L (ref 45–117)
ALT SERPL-CCNC: 32 U/L (ref 13–56)
ANION GAP SERPL CALC-SCNC: 13 MMOL/L (ref 3–18)
APPEARANCE UR: ABNORMAL
APTT PPP: 27.5 SEC (ref 23–36.4)
AST SERPL-CCNC: 20 U/L (ref 15–37)
ATRIAL RATE: 62 BPM
BACTERIA SPEC CULT: NORMAL
BACTERIA URNS QL MICRO: ABNORMAL /HPF
BASOPHILS # BLD: 0 K/UL (ref 0–0.1)
BASOPHILS NFR BLD: 0 % (ref 0–2)
BILIRUB SERPL-MCNC: 0.3 MG/DL (ref 0.2–1)
BILIRUB UR QL: NEGATIVE
BUN SERPL-MCNC: 28 MG/DL (ref 7–18)
BUN/CREAT SERPL: 25 (ref 12–20)
CALCIUM SERPL-MCNC: 9.7 MG/DL (ref 8.5–10.1)
CALCULATED P AXIS, ECG09: 38 DEGREES
CALCULATED R AXIS, ECG10: -31 DEGREES
CALCULATED T AXIS, ECG11: 19 DEGREES
CHLORIDE SERPL-SCNC: 102 MMOL/L (ref 100–108)
CO2 SERPL-SCNC: 25 MMOL/L (ref 21–32)
COLOR UR: YELLOW
CREAT SERPL-MCNC: 1.14 MG/DL (ref 0.6–1.3)
DIAGNOSIS, 93000: NORMAL
DIFFERENTIAL METHOD BLD: NORMAL
EOSINOPHIL # BLD: 0.3 K/UL (ref 0–0.4)
EOSINOPHIL NFR BLD: 3 % (ref 0–5)
EPITH CASTS URNS QL MICRO: ABNORMAL /LPF (ref 0–5)
ERYTHROCYTE [DISTWIDTH] IN BLOOD BY AUTOMATED COUNT: 13.7 % (ref 11.6–14.5)
EST. AVERAGE GLUCOSE BLD GHB EST-MCNC: 174 MG/DL
GLOBULIN SER CALC-MCNC: 3 G/DL (ref 2–4)
GLUCOSE SERPL-MCNC: 119 MG/DL (ref 74–99)
GLUCOSE UR STRIP.AUTO-MCNC: NEGATIVE MG/DL
HBA1C MFR BLD: 7.7 % (ref 4.5–5.6)
HCT VFR BLD AUTO: 40.6 % (ref 35–45)
HGB BLD-MCNC: 13.4 G/DL (ref 12–16)
HGB UR QL STRIP: NEGATIVE
INR PPP: 0.9 (ref 0.8–1.2)
KETONES UR QL STRIP.AUTO: NEGATIVE MG/DL
LEUKOCYTE ESTERASE UR QL STRIP.AUTO: ABNORMAL
LYMPHOCYTES # BLD: 2.5 K/UL (ref 0.9–3.6)
LYMPHOCYTES NFR BLD: 27 % (ref 21–52)
MCH RBC QN AUTO: 29.5 PG (ref 24–34)
MCHC RBC AUTO-ENTMCNC: 33 G/DL (ref 31–37)
MCV RBC AUTO: 89.4 FL (ref 74–97)
MONOCYTES # BLD: 0.8 K/UL (ref 0.05–1.2)
MONOCYTES NFR BLD: 8 % (ref 3–10)
NEUTS SEG # BLD: 5.6 K/UL (ref 1.8–8)
NEUTS SEG NFR BLD: 62 % (ref 40–73)
NITRITE UR QL STRIP.AUTO: POSITIVE
P-R INTERVAL, ECG05: 176 MS
PH UR STRIP: 5 [PH] (ref 5–8)
PLATELET # BLD AUTO: 205 K/UL (ref 135–420)
PMV BLD AUTO: 11.3 FL (ref 9.2–11.8)
POTASSIUM SERPL-SCNC: 3.8 MMOL/L (ref 3.5–5.5)
PROT SERPL-MCNC: 6.6 G/DL (ref 6.4–8.2)
PROT UR STRIP-MCNC: NEGATIVE MG/DL
PROTHROMBIN TIME: 11.7 SEC (ref 11.5–15.2)
Q-T INTERVAL, ECG07: 420 MS
QRS DURATION, ECG06: 92 MS
QTC CALCULATION (BEZET), ECG08: 426 MS
RBC # BLD AUTO: 4.54 M/UL (ref 4.2–5.3)
RBC #/AREA URNS HPF: NEGATIVE /HPF (ref 0–5)
SERVICE CMNT-IMP: NORMAL
SODIUM SERPL-SCNC: 140 MMOL/L (ref 136–145)
SP GR UR REFRACTOMETRY: 1.02 (ref 1–1.03)
UROBILINOGEN UR QL STRIP.AUTO: 0.2 EU/DL (ref 0.2–1)
VENTRICULAR RATE, ECG03: 62 BPM
WBC # BLD AUTO: 9.2 K/UL (ref 4.6–13.2)
WBC URNS QL MICRO: ABNORMAL /HPF (ref 0–5)

## 2018-08-16 PROCEDURE — 81001 URINALYSIS AUTO W/SCOPE: CPT | Performed by: ORTHOPAEDIC SURGERY

## 2018-08-16 PROCEDURE — 85730 THROMBOPLASTIN TIME PARTIAL: CPT | Performed by: ORTHOPAEDIC SURGERY

## 2018-08-16 PROCEDURE — 85610 PROTHROMBIN TIME: CPT | Performed by: ORTHOPAEDIC SURGERY

## 2018-08-16 PROCEDURE — 80053 COMPREHEN METABOLIC PANEL: CPT | Performed by: ORTHOPAEDIC SURGERY

## 2018-08-16 PROCEDURE — 83036 HEMOGLOBIN GLYCOSYLATED A1C: CPT | Performed by: ORTHOPAEDIC SURGERY

## 2018-08-16 PROCEDURE — 87086 URINE CULTURE/COLONY COUNT: CPT | Performed by: ORTHOPAEDIC SURGERY

## 2018-08-16 PROCEDURE — 87641 MR-STAPH DNA AMP PROBE: CPT | Performed by: ORTHOPAEDIC SURGERY

## 2018-08-16 PROCEDURE — 87186 SC STD MICRODIL/AGAR DIL: CPT | Performed by: ORTHOPAEDIC SURGERY

## 2018-08-16 PROCEDURE — 85025 COMPLETE CBC W/AUTO DIFF WBC: CPT | Performed by: ORTHOPAEDIC SURGERY

## 2018-08-16 PROCEDURE — 87077 CULTURE AEROBIC IDENTIFY: CPT | Performed by: ORTHOPAEDIC SURGERY

## 2018-08-16 PROCEDURE — 71045 X-RAY EXAM CHEST 1 VIEW: CPT

## 2018-08-16 PROCEDURE — 93005 ELECTROCARDIOGRAM TRACING: CPT

## 2018-08-16 RX ORDER — AMLODIPINE BESYLATE 5 MG/1
5 TABLET ORAL
COMMUNITY

## 2018-08-16 RX ORDER — METFORMIN HYDROCHLORIDE 500 MG/1
500 TABLET ORAL
COMMUNITY

## 2018-08-16 RX ORDER — METFORMIN HYDROCHLORIDE 1000 MG/1
1000 TABLET ORAL
COMMUNITY

## 2018-08-16 RX ORDER — MULTIVITAMIN
1 TABLET ORAL 2 TIMES DAILY
COMMUNITY

## 2018-08-16 RX ORDER — GLUCOSAM/CHONDRO/HERB 149/HYAL 750-100 MG
1 TABLET ORAL DAILY
COMMUNITY

## 2018-08-16 RX ORDER — CEFAZOLIN SODIUM/WATER 2 G/20 ML
2 SYRINGE (ML) INTRAVENOUS ONCE
Status: CANCELLED | OUTPATIENT
Start: 2018-08-16 | End: 2018-08-16

## 2018-08-16 RX ORDER — SODIUM CHLORIDE, SODIUM LACTATE, POTASSIUM CHLORIDE, CALCIUM CHLORIDE 600; 310; 30; 20 MG/100ML; MG/100ML; MG/100ML; MG/100ML
125 INJECTION, SOLUTION INTRAVENOUS CONTINUOUS
Status: CANCELLED | OUTPATIENT
Start: 2018-08-16

## 2018-08-16 RX ORDER — MUPIROCIN CALCIUM 20 MG/G
CREAM TOPICAL 2 TIMES DAILY
COMMUNITY
End: 2019-04-11

## 2018-08-16 RX ORDER — INDOMETHACIN 25 MG/1
25 CAPSULE ORAL
COMMUNITY

## 2018-08-17 NOTE — PERIOP NOTES
Called Dr. Thierno Kim office to report urine results. Office staff sending message to \"Yessenia\". I called the [facility] lab and asked that the sample from yesterday not be destroyed as I anticipated a urine C&S ordered. Spoke to \"Peña\" in chemistry and she will pull sample.

## 2018-08-19 LAB
BACTERIA SPEC CULT: ABNORMAL
SERVICE CMNT-IMP: ABNORMAL

## 2018-08-21 ENCOUNTER — HOSPITAL ENCOUNTER (OUTPATIENT)
Dept: PHYSICAL THERAPY | Age: 78
Discharge: HOME | End: 2018-08-21
Payer: MEDICARE

## 2018-08-21 PROCEDURE — G8984 CARRY CURRENT STATUS: HCPCS | Performed by: PHYSICAL THERAPIST

## 2018-08-21 PROCEDURE — G8985 CARRY GOAL STATUS: HCPCS | Performed by: PHYSICAL THERAPIST

## 2018-08-21 PROCEDURE — G8986 CARRY D/C STATUS: HCPCS | Performed by: PHYSICAL THERAPIST

## 2018-08-21 PROCEDURE — 97110 THERAPEUTIC EXERCISES: CPT | Performed by: PHYSICAL THERAPIST

## 2018-08-21 PROCEDURE — 97161 PT EVAL LOW COMPLEX 20 MIN: CPT | Performed by: PHYSICAL THERAPIST

## 2018-08-21 NOTE — PROGRESS NOTES
[facility] - [facility]       [address] [place], [address] [facility] [place]  Ph [phone]  Fx [phone]    Orthopedic Surgical Pre-op Assessment and Plan of Care     Patient name: Wyman Galeazzi Morr Start of Care: 2018   Referral source: Vanita Sinclair MD : 1940    Medical Diagnosis: Pain in right shoulder [M25.511] Surgery Date:2018    Prior Hospitalization: see medical history Provider#: [id number]   Medications: Verified on Patient summary List   Date:2018      [x]  Patient  Verified  Payor: Jany Linker / Plan: [place] & B / Product Type: Medicare /          In time:9:30  Out time:10:30  Total Treatment Time (min): 60  Total Timed Codes (min): 15  1:1 Treatment Time ( [address] only): 60   Visit #: 1 of 1           Subjective Review:  Pain Level (0-10 scale): 3-4  Any medication changes, allergies to medications, adverse drug reactions, diagnosis change, or new procedure performed?: [x] No    [] Yes (see summary sheet for update)  Patient has CC of left shoulder pain for 12 months. AZEEM: denies. Patient describes pain as achy, sharp. No diurnal symptoms. Denies numbness/tingling . Reports popping/clicking. Aggravating factors:using the arm. Alleviating factors: not using. Denies red flags: SOB, chest pain, dizziness/lightheadedness, blurred/double vision, HA, chills/fevers, night sweats, change in bowel/bladder control, abdominal pain, difficulty swallowing, slurred speech, unexplained weight gain/loss. PMHx: DMII, HTN, OA, Hx of uterine CA. Surgical Hx: B partial knee replacements (), right RCR . Social Hx: 2level home, alcohol, tobacco, work status. PLOF: sedentary. CLOF: same. Diagnostic Imaging: x-ray: OA    Motivation: good  Substance use: [] Alcohol     [] tobacco [] other:  Cognition: A & O x 4    Other:    What type of home do you live in now? 2 level    Do you plan to return there after surgery?    Yes                   If no, where are planning to live after surgery? NA    How many stairs/steps to enter your home? 3 Railing:  Yes    How many stairs/steps inside of your home? 13 Railing:  Yes     Can you stay on the entry level? Yes    Do you live alone? No    Will anyone be available to help you when you leave the hospital?  Yes                If yes, what is their relationship to you?  and daughter         Can they assist you with bathing and dressing? Yes     Can someone assist you with transportation to physical therapy? Yes    Where do you plan to go when you leave the hospital? home    What is your employment status? [x] Retired     [] Disabled     [] Employed  []  Unemployed  If EMPLOYED, job requires mainly: [] Sitting     [] Light Activity     [] Heavy Activity  Do you own or are any of the following items available to you (check all that apply)? [] Walker: [] Wheels [] No Wheels     [] Clerance Creole     [] Crutches     [] Shower Chair/Tub Bench   [] Bedside Commode/Elevated Toilet Seat     [] Other equipment: In your bathroom, do you have the following? Check all that apply. [] Tub Only   [] Tub/Shower Combination  [] Walk-in Shower    Are you:  right handed     Describe your Functional Level (check all that apply):     Bathing [x] Independent   [] Require Assistance from someone   [] Sponge Bath Only        Dressing [x] Independent     [] Require assistance from someone for: [] socks/shoes   [] pants   [] everything   Walking [] Independent   [] Walk indoors only   [] Walk outdoors   [x] Use assistive device       [] Use wheelchair only        Household Activities [x] Routine house & yard work   [] Light house work only   [] None                       OM:  [] Unable to assess at this time     Strength:   [] Unable to assess at this time                                                                                                    [x] Left  [] Right Gross Strength Left Right   Elevation against gravity [] WNL  [x] Impaired  [] Unable Flexion [] WNL  [] Impaired  [] Unable [] WNL  [] Impaired  [] Unable   Quality of motion [] WNL  [x] Impaired   Scaption/ABD [] WNL  [] Impaired  [] Unable [] WNL  [] Impaired  [] Unable   Endfeel [] Soft  [] Hard  [] Springy  [x] Empty IR/ER [] WNL  [] Impaired  [] Unable [] WNL  [] Impaired  [] Unable     Objective Screen:  1. Functional testing to determine fall risk (to be completed with a sling on the operative arm)  a. TUG: Time 22.29 sec    Assistive Device: none    Fall Risk :  Yes * a score of >14 seconds indicates fall risk   (low complexity <10sec, moderate complexity 10-20sec, high complexity >20sec)  b. Stair Climbing Test: Time 54 sec  Assistive Device: none   Fall Risk :   Yes  c. Functional Screen:   Sit-Supine:  Independent           Supine-sit:  Stand-by assistance                Transfers:    Independent        Static Balance: 2 trials for 30 seconds with S assistance   EO/EC    [place]: [x]Floor   []Foam  [] Other    MSR:           []Floor   []Foam  [] Other  Gait Distance/Safety:   Household Distances with none and S assistance. Capable caregiver  Not applicable  Community Distances with walker  and S assistance. Capable caregiver  Yes    15 min Therapeutic Exercise:  [x] See flow sheet :   Rationale: increase ROM, increase strength and decrease pain to improve the patients ability to complete ADLs    Patient/Caregiver Education/Goals: To be accomplished in 1 visit  1. Caregiver/Patient demonstrates understanding of TSR precautions as stated on education form. Goal MET  Yes  2. Caregiver/Patient demonstrates and verbalizes understanding of HEP as issued on exercise handout (Pendulums, shoulder isometrics: flexion, extension, abduction, scapular squeezes, assisted elbow flexion/extension, wrist AROM, gripping). Goal MET  Yes  3. Caregiver/Patient demonstrates understanding of [place] a shirt withTSR precautions as stated on education form.   Goal MET Yes  4. Caregiver/Patient demonstrates understanding of [place] sling withTSR precautions as stated on education form. Goal MET  Yes  Pain Level at end of session (0-10 scale): 4     The Plan of Care and following information is based on the information from the initial evaluation. Assessment/ key information: Patient presents 3 wks prior to left reverse TSA on 9/12/2018. Patient has good family support and is capable of returning home after surgery with HHPT prior to beginning OPPT.     Evaluation Complexity History HIGH Complexity :3+ comorbidities / personal factors will impact the outcome/ POC ; Examination MEDIUM Complexity : 3 Standardized tests and measures addressing body structure, function, activity limitation and / or participation in recreation  ;Presentation LOW Complexity : Stable, uncomplicated  ;Clinical Decision Making Other outcome measures DASH = 68%  MEDIUM  Overall Complexity Rating: LOW     Problem List: pain affecting function, decrease ROM, decrease strength, impaired gait/ balance, decrease ADL/ functional abilitiies, decrease activity tolerance, decrease flexibility/ joint mobility and decrease transfer abilities     Treatment Plan may include any combination of the following: Therapeutic exercise, Therapeutic activities, Neuromuscular re-education and Physical agent/modality    Patient / Family readiness to learn indicated by: asking questions, trying to perform skills and interest    Persons(s) to be included in education: patient (P)    Barriers to Learning/Limitations: None    Patient Goal Following Surgery: decrease pain    Patient Self Reported Health Status: fair    Rehabilitation Potential: good    Recommend Discharge to: [x] Outpatient PT     [x] Home Health PT     [] SNF     Frequency / Duration: Patient to be seen 1 time per week for 1 treatment    G-Codes (GP)  Carry   Current  CL= 60-79%    Goal  CL= 60-79%   D/C  CL= 60-79%    The severity rating is based on clinical judgment. Certification Period: 8/21/2018 - 8/21/2018  Bridget Fung PT, DPT 8/21/2018 9:51 AM    ________________________________________________________________________    I certify that the above Therapy Services are being furnished while the patient is under my care. I agree with the treatment plan and certify that this therapy is necessary.     [de-identified] Signature:____________________  Date:____________Time: _________

## 2018-09-11 ENCOUNTER — ANESTHESIA EVENT (OUTPATIENT)
Dept: SURGERY | Age: 78
DRG: 483 | End: 2018-09-11
Payer: MEDICARE

## 2018-09-12 ENCOUNTER — HOSPITAL ENCOUNTER (INPATIENT)
Age: 78
LOS: 2 days | Discharge: HOME HEALTH | DRG: 483 | End: 2018-09-14
Attending: ORTHOPAEDIC SURGERY | Admitting: ORTHOPAEDIC SURGERY
Payer: MEDICARE

## 2018-09-12 ENCOUNTER — APPOINTMENT (OUTPATIENT)
Dept: GENERAL RADIOLOGY | Age: 78
DRG: 483 | End: 2018-09-12
Attending: PHYSICIAN ASSISTANT
Payer: MEDICARE

## 2018-09-12 ENCOUNTER — ANESTHESIA (OUTPATIENT)
Dept: SURGERY | Age: 78
DRG: 483 | End: 2018-09-12
Payer: MEDICARE

## 2018-09-12 DIAGNOSIS — Z96.612 H/O TOTAL SHOULDER REPLACEMENT, LEFT: Primary | ICD-10-CM

## 2018-09-12 LAB
ABO + RH BLD: NORMAL
APPEARANCE UR: CLEAR
BILIRUB UR QL: NEGATIVE
BLOOD GROUP ANTIBODIES SERPL: NORMAL
COLOR UR: YELLOW
GLUCOSE BLD STRIP.AUTO-MCNC: 121 MG/DL (ref 70–110)
GLUCOSE BLD STRIP.AUTO-MCNC: 154 MG/DL (ref 70–110)
GLUCOSE BLD STRIP.AUTO-MCNC: 300 MG/DL (ref 70–110)
GLUCOSE UR STRIP.AUTO-MCNC: NEGATIVE MG/DL
HGB UR QL STRIP: NEGATIVE
KETONES UR QL STRIP.AUTO: NEGATIVE MG/DL
LEUKOCYTE ESTERASE UR QL STRIP.AUTO: NEGATIVE
NITRITE UR QL STRIP.AUTO: NEGATIVE
PH UR STRIP: 5 [PH] (ref 5–8)
PROT UR STRIP-MCNC: NEGATIVE MG/DL
SP GR UR REFRACTOMETRY: 1.02 (ref 1–1.03)
SPECIMEN EXP DATE BLD: NORMAL
UROBILINOGEN UR QL STRIP.AUTO: 0.2 EU/DL (ref 0.2–1)

## 2018-09-12 PROCEDURE — 65270000029 HC RM PRIVATE

## 2018-09-12 PROCEDURE — 74011000250 HC RX REV CODE- 250: Performed by: SPECIALIST

## 2018-09-12 PROCEDURE — 86900 BLOOD TYPING SEROLOGIC ABO: CPT | Performed by: ORTHOPAEDIC SURGERY

## 2018-09-12 PROCEDURE — 82962 GLUCOSE BLOOD TEST: CPT

## 2018-09-12 PROCEDURE — 74011250637 HC RX REV CODE- 250/637: Performed by: PHYSICIAN ASSISTANT

## 2018-09-12 PROCEDURE — 77030008683 HC TU ET CUF COVD -A: Performed by: SPECIALIST

## 2018-09-12 PROCEDURE — 64415 NJX AA&/STRD BRCH PLXS IMG: CPT | Performed by: SPECIALIST

## 2018-09-12 PROCEDURE — 74011636637 HC RX REV CODE- 636/637: Performed by: PHYSICIAN ASSISTANT

## 2018-09-12 PROCEDURE — 77030002912 HC SUT ETHBND J&J -A: Performed by: ORTHOPAEDIC SURGERY

## 2018-09-12 PROCEDURE — 76010000132 HC OR TIME 2.5 TO 3 HR: Performed by: ORTHOPAEDIC SURGERY

## 2018-09-12 PROCEDURE — 74011250636 HC RX REV CODE- 250/636: Performed by: ORTHOPAEDIC SURGERY

## 2018-09-12 PROCEDURE — 97110 THERAPEUTIC EXERCISES: CPT

## 2018-09-12 PROCEDURE — 74011000250 HC RX REV CODE- 250: Performed by: ORTHOPAEDIC SURGERY

## 2018-09-12 PROCEDURE — 77030020471 HC BIT DRL CREV ZIMM -C: Performed by: ORTHOPAEDIC SURGERY

## 2018-09-12 PROCEDURE — 74011250636 HC RX REV CODE- 250/636

## 2018-09-12 PROCEDURE — 0RRK00Z REPLACEMENT OF LEFT SHOULDER JOINT WITH REVERSE BALL AND SOCKET SYNTHETIC SUBSTITUTE, OPEN APPROACH: ICD-10-PCS | Performed by: ORTHOPAEDIC SURGERY

## 2018-09-12 PROCEDURE — 77030037875 HC DRSG MEPILEX <16IN BORD MOLN -A: Performed by: ORTHOPAEDIC SURGERY

## 2018-09-12 PROCEDURE — 74011250636 HC RX REV CODE- 250/636: Performed by: SPECIALIST

## 2018-09-12 PROCEDURE — 77030006643: Performed by: SPECIALIST

## 2018-09-12 PROCEDURE — 77030018547 HC SUT ETHBND1 J&J -B: Performed by: ORTHOPAEDIC SURGERY

## 2018-09-12 PROCEDURE — 36415 COLL VENOUS BLD VENIPUNCTURE: CPT | Performed by: ORTHOPAEDIC SURGERY

## 2018-09-12 PROCEDURE — 77030020256 HC SOL INJ NACL 0.9%  500ML: Performed by: ORTHOPAEDIC SURGERY

## 2018-09-12 PROCEDURE — 73030 X-RAY EXAM OF SHOULDER: CPT

## 2018-09-12 PROCEDURE — C9290 INJ, BUPIVACAINE LIPOSOME: HCPCS | Performed by: ORTHOPAEDIC SURGERY

## 2018-09-12 PROCEDURE — 76210000016 HC OR PH I REC 1 TO 1.5 HR: Performed by: ORTHOPAEDIC SURGERY

## 2018-09-12 PROCEDURE — 74011250636 HC RX REV CODE- 250/636: Performed by: PHYSICIAN ASSISTANT

## 2018-09-12 PROCEDURE — 81003 URINALYSIS AUTO W/O SCOPE: CPT | Performed by: ORTHOPAEDIC SURGERY

## 2018-09-12 PROCEDURE — 77030013708 HC HNDPC SUC IRR PULS STRY –B: Performed by: ORTHOPAEDIC SURGERY

## 2018-09-12 PROCEDURE — 77030012406 HC DRN WND PENRS BARD -A: Performed by: ORTHOPAEDIC SURGERY

## 2018-09-12 PROCEDURE — 77030039267 HC ADH SKN EXOFIN S2SG -B: Performed by: ORTHOPAEDIC SURGERY

## 2018-09-12 PROCEDURE — 77030016547 HC BLD SAW SAG1 STRY -B: Performed by: ORTHOPAEDIC SURGERY

## 2018-09-12 PROCEDURE — 77030027138 HC INCENT SPIROMETER -A: Performed by: ORTHOPAEDIC SURGERY

## 2018-09-12 PROCEDURE — 77030038020 HC MANFLD NEPTUNE STRY -B: Performed by: ORTHOPAEDIC SURGERY

## 2018-09-12 PROCEDURE — 76060000036 HC ANESTHESIA 2.5 TO 3 HR: Performed by: ORTHOPAEDIC SURGERY

## 2018-09-12 PROCEDURE — 74011000250 HC RX REV CODE- 250

## 2018-09-12 PROCEDURE — 77030032490 HC SLV COMPR SCD KNE COVD -B: Performed by: ORTHOPAEDIC SURGERY

## 2018-09-12 PROCEDURE — 77030006835 HC BLD SAW SAG STRY -B: Performed by: ORTHOPAEDIC SURGERY

## 2018-09-12 PROCEDURE — 77030020782 HC GWN BAIR PAWS FLX 3M -B: Performed by: ORTHOPAEDIC SURGERY

## 2018-09-12 PROCEDURE — 76942 ECHO GUIDE FOR BIOPSY: CPT | Performed by: ORTHOPAEDIC SURGERY

## 2018-09-12 PROCEDURE — 74011000258 HC RX REV CODE- 258: Performed by: ORTHOPAEDIC SURGERY

## 2018-09-12 PROCEDURE — C1776 JOINT DEVICE (IMPLANTABLE): HCPCS | Performed by: ORTHOPAEDIC SURGERY

## 2018-09-12 PROCEDURE — 74011250637 HC RX REV CODE- 250/637: Performed by: SPECIALIST

## 2018-09-12 PROCEDURE — 77030016060 HC NDL NRV BLK TELE -A: Performed by: SPECIALIST

## 2018-09-12 PROCEDURE — 74011000258 HC RX REV CODE- 258

## 2018-09-12 PROCEDURE — 77030002933 HC SUT MCRYL J&J -A: Performed by: ORTHOPAEDIC SURGERY

## 2018-09-12 PROCEDURE — 77030013079 HC BLNKT BAIR HGGR 3M -A: Performed by: SPECIALIST

## 2018-09-12 PROCEDURE — 97161 PT EVAL LOW COMPLEX 20 MIN: CPT

## 2018-09-12 PROCEDURE — 74011636637 HC RX REV CODE- 636/637: Performed by: SPECIALIST

## 2018-09-12 PROCEDURE — 77030031139 HC SUT VCRL2 J&J -A: Performed by: ORTHOPAEDIC SURGERY

## 2018-09-12 DEVICE — SHOULDER REV IMPL -- S4 BSV SC: Type: IMPLANTABLE DEVICE | Site: SHOULDER | Status: FUNCTIONAL

## 2018-09-12 DEVICE — IMPLANTABLE DEVICE
Type: IMPLANTABLE DEVICE | Site: SHOULDER | Status: FUNCTIONAL
Brand: COMPREHENSIVE REVERSE SHOULDER

## 2018-09-12 DEVICE — IMPLANTABLE DEVICE
Type: IMPLANTABLE DEVICE | Site: SHOULDER | Status: FUNCTIONAL
Brand: COMPREHENSIVE® REVERSE SHOULDER

## 2018-09-12 DEVICE — IMPLANTABLE DEVICE
Type: IMPLANTABLE DEVICE | Site: SHOULDER | Status: FUNCTIONAL
Brand: COMPREHENSIVE SHOULDER SYSTEM

## 2018-09-12 RX ORDER — PROPOFOL 10 MG/ML
INJECTION, EMULSION INTRAVENOUS AS NEEDED
Status: DISCONTINUED | OUTPATIENT
Start: 2018-09-12 | End: 2018-09-12 | Stop reason: HOSPADM

## 2018-09-12 RX ORDER — MAGNESIUM SULFATE 100 %
4 CRYSTALS MISCELLANEOUS AS NEEDED
Status: DISCONTINUED | OUTPATIENT
Start: 2018-09-12 | End: 2018-09-12 | Stop reason: HOSPADM

## 2018-09-12 RX ORDER — NALOXONE HYDROCHLORIDE 0.4 MG/ML
0.4 INJECTION, SOLUTION INTRAMUSCULAR; INTRAVENOUS; SUBCUTANEOUS AS NEEDED
Status: DISCONTINUED | OUTPATIENT
Start: 2018-09-12 | End: 2018-09-14 | Stop reason: HOSPADM

## 2018-09-12 RX ORDER — ONDANSETRON 2 MG/ML
4 INJECTION INTRAMUSCULAR; INTRAVENOUS ONCE
Status: DISCONTINUED | OUTPATIENT
Start: 2018-09-12 | End: 2018-09-12 | Stop reason: HOSPADM

## 2018-09-12 RX ORDER — LORAZEPAM 0.5 MG/1
0.5 TABLET ORAL
Status: DISCONTINUED | OUTPATIENT
Start: 2018-09-12 | End: 2018-09-14 | Stop reason: HOSPADM

## 2018-09-12 RX ORDER — ONDANSETRON 2 MG/ML
INJECTION INTRAMUSCULAR; INTRAVENOUS AS NEEDED
Status: DISCONTINUED | OUTPATIENT
Start: 2018-09-12 | End: 2018-09-12 | Stop reason: HOSPADM

## 2018-09-12 RX ORDER — ROPIVACAINE HYDROCHLORIDE 5 MG/ML
INJECTION, SOLUTION EPIDURAL; INFILTRATION; PERINEURAL AS NEEDED
Status: DISCONTINUED | OUTPATIENT
Start: 2018-09-12 | End: 2018-09-12 | Stop reason: HOSPADM

## 2018-09-12 RX ORDER — SODIUM CHLORIDE 0.9 % (FLUSH) 0.9 %
5-10 SYRINGE (ML) INJECTION EVERY 8 HOURS
Status: DISCONTINUED | OUTPATIENT
Start: 2018-09-12 | End: 2018-09-14 | Stop reason: HOSPADM

## 2018-09-12 RX ORDER — SODIUM CHLORIDE, SODIUM LACTATE, POTASSIUM CHLORIDE, CALCIUM CHLORIDE 600; 310; 30; 20 MG/100ML; MG/100ML; MG/100ML; MG/100ML
125 INJECTION, SOLUTION INTRAVENOUS CONTINUOUS
Status: DISCONTINUED | OUTPATIENT
Start: 2018-09-12 | End: 2018-09-12

## 2018-09-12 RX ORDER — ACETAMINOPHEN 325 MG/1
650 TABLET ORAL
Status: DISCONTINUED | OUTPATIENT
Start: 2018-09-12 | End: 2018-09-14

## 2018-09-12 RX ORDER — FENTANYL CITRATE 50 UG/ML
50 INJECTION, SOLUTION INTRAMUSCULAR; INTRAVENOUS
Status: DISCONTINUED | OUTPATIENT
Start: 2018-09-12 | End: 2018-09-12 | Stop reason: HOSPADM

## 2018-09-12 RX ORDER — DIPHENHYDRAMINE HYDROCHLORIDE 50 MG/ML
25 INJECTION, SOLUTION INTRAMUSCULAR; INTRAVENOUS
Status: DISCONTINUED | OUTPATIENT
Start: 2018-09-12 | End: 2018-09-14 | Stop reason: HOSPADM

## 2018-09-12 RX ORDER — ATENOLOL 25 MG/1
25 TABLET ORAL DAILY
Status: DISCONTINUED | OUTPATIENT
Start: 2018-09-13 | End: 2018-09-14 | Stop reason: HOSPADM

## 2018-09-12 RX ORDER — INSULIN LISPRO 100 [IU]/ML
1 INJECTION, SOLUTION INTRAVENOUS; SUBCUTANEOUS ONCE
Status: COMPLETED | OUTPATIENT
Start: 2018-09-12 | End: 2018-09-12

## 2018-09-12 RX ORDER — BUPIVACAINE HYDROCHLORIDE 2.5 MG/ML
INJECTION, SOLUTION EPIDURAL; INFILTRATION; INTRACAUDAL AS NEEDED
Status: DISCONTINUED | OUTPATIENT
Start: 2018-09-12 | End: 2018-09-12 | Stop reason: HOSPADM

## 2018-09-12 RX ORDER — ACETAMINOPHEN 650 MG/1
650 SUPPOSITORY RECTAL
Status: DISCONTINUED | OUTPATIENT
Start: 2018-09-12 | End: 2018-09-14

## 2018-09-12 RX ORDER — ONDANSETRON 4 MG/1
4 TABLET, ORALLY DISINTEGRATING ORAL ONCE
Status: COMPLETED | OUTPATIENT
Start: 2018-09-12 | End: 2018-09-12

## 2018-09-12 RX ORDER — ROCURONIUM BROMIDE 10 MG/ML
INJECTION, SOLUTION INTRAVENOUS AS NEEDED
Status: DISCONTINUED | OUTPATIENT
Start: 2018-09-12 | End: 2018-09-12 | Stop reason: HOSPADM

## 2018-09-12 RX ORDER — MECLIZINE HCL 12.5 MG 12.5 MG/1
25 TABLET ORAL ONCE
Status: COMPLETED | OUTPATIENT
Start: 2018-09-12 | End: 2018-09-12

## 2018-09-12 RX ORDER — AMLODIPINE BESYLATE 5 MG/1
5 TABLET ORAL
Status: DISCONTINUED | OUTPATIENT
Start: 2018-09-12 | End: 2018-09-14 | Stop reason: HOSPADM

## 2018-09-12 RX ORDER — SODIUM CHLORIDE 0.9 % (FLUSH) 0.9 %
5-10 SYRINGE (ML) INJECTION AS NEEDED
Status: DISCONTINUED | OUTPATIENT
Start: 2018-09-12 | End: 2018-09-14 | Stop reason: HOSPADM

## 2018-09-12 RX ORDER — LIDOCAINE HYDROCHLORIDE 20 MG/ML
INJECTION, SOLUTION EPIDURAL; INFILTRATION; INTRACAUDAL; PERINEURAL AS NEEDED
Status: DISCONTINUED | OUTPATIENT
Start: 2018-09-12 | End: 2018-09-12 | Stop reason: HOSPADM

## 2018-09-12 RX ORDER — GLYCOPYRROLATE 0.2 MG/ML
INJECTION INTRAMUSCULAR; INTRAVENOUS AS NEEDED
Status: DISCONTINUED | OUTPATIENT
Start: 2018-09-12 | End: 2018-09-12 | Stop reason: HOSPADM

## 2018-09-12 RX ORDER — HEPARIN SODIUM 5000 [USP'U]/ML
5000 INJECTION, SOLUTION INTRAVENOUS; SUBCUTANEOUS EVERY 8 HOURS
Status: DISCONTINUED | OUTPATIENT
Start: 2018-09-13 | End: 2018-09-14 | Stop reason: HOSPADM

## 2018-09-12 RX ORDER — SODIUM CHLORIDE 0.9 % (FLUSH) 0.9 %
5-10 SYRINGE (ML) INJECTION AS NEEDED
Status: DISCONTINUED | OUTPATIENT
Start: 2018-09-12 | End: 2018-09-12 | Stop reason: HOSPADM

## 2018-09-12 RX ORDER — CEFAZOLIN SODIUM/WATER 2 G/20 ML
2 SYRINGE (ML) INTRAVENOUS ONCE
Status: COMPLETED | OUTPATIENT
Start: 2018-09-12 | End: 2018-09-12

## 2018-09-12 RX ORDER — FENTANYL CITRATE 50 UG/ML
INJECTION, SOLUTION INTRAMUSCULAR; INTRAVENOUS AS NEEDED
Status: DISCONTINUED | OUTPATIENT
Start: 2018-09-12 | End: 2018-09-12 | Stop reason: HOSPADM

## 2018-09-12 RX ORDER — EPHEDRINE SULFATE/0.9% NACL/PF 25 MG/5 ML
SYRINGE (ML) INTRAVENOUS AS NEEDED
Status: DISCONTINUED | OUTPATIENT
Start: 2018-09-12 | End: 2018-09-12 | Stop reason: HOSPADM

## 2018-09-12 RX ORDER — ALLOPURINOL 300 MG/1
300 TABLET ORAL DAILY
Status: DISCONTINUED | OUTPATIENT
Start: 2018-09-13 | End: 2018-09-14 | Stop reason: HOSPADM

## 2018-09-12 RX ORDER — MAGNESIUM SULFATE 100 %
4 CRYSTALS MISCELLANEOUS AS NEEDED
Status: DISCONTINUED | OUTPATIENT
Start: 2018-09-12 | End: 2018-09-14 | Stop reason: HOSPADM

## 2018-09-12 RX ORDER — OXYCODONE HYDROCHLORIDE 5 MG/1
5 TABLET ORAL
Status: DISCONTINUED | OUTPATIENT
Start: 2018-09-12 | End: 2018-09-13

## 2018-09-12 RX ORDER — ACETAMINOPHEN 500 MG
1000 TABLET ORAL ONCE
Status: COMPLETED | OUTPATIENT
Start: 2018-09-12 | End: 2018-09-12

## 2018-09-12 RX ORDER — HYDROMORPHONE HYDROCHLORIDE 2 MG/ML
0.5 INJECTION, SOLUTION INTRAMUSCULAR; INTRAVENOUS; SUBCUTANEOUS
Status: DISPENSED | OUTPATIENT
Start: 2018-09-12 | End: 2018-09-13

## 2018-09-12 RX ORDER — SODIUM CHLORIDE 9 MG/ML
125 INJECTION, SOLUTION INTRAVENOUS CONTINUOUS
Status: DISCONTINUED | OUTPATIENT
Start: 2018-09-12 | End: 2018-09-12 | Stop reason: HOSPADM

## 2018-09-12 RX ORDER — INSULIN LISPRO 100 [IU]/ML
INJECTION, SOLUTION INTRAVENOUS; SUBCUTANEOUS
Status: DISCONTINUED | OUTPATIENT
Start: 2018-09-12 | End: 2018-09-14 | Stop reason: HOSPADM

## 2018-09-12 RX ORDER — METFORMIN HYDROCHLORIDE 500 MG/1
500 TABLET ORAL
Status: DISCONTINUED | OUTPATIENT
Start: 2018-09-13 | End: 2018-09-14 | Stop reason: HOSPADM

## 2018-09-12 RX ORDER — DEXTROSE 50 % IN WATER (D50W) INTRAVENOUS SYRINGE
25-50 AS NEEDED
Status: DISCONTINUED | OUTPATIENT
Start: 2018-09-12 | End: 2018-09-14 | Stop reason: HOSPADM

## 2018-09-12 RX ORDER — POVIDONE-IODINE 10 %
SOLUTION, NON-ORAL TOPICAL AS NEEDED
Status: DISCONTINUED | OUTPATIENT
Start: 2018-09-12 | End: 2018-09-12 | Stop reason: HOSPADM

## 2018-09-12 RX ORDER — FENTANYL CITRATE 50 UG/ML
25 INJECTION, SOLUTION INTRAMUSCULAR; INTRAVENOUS AS NEEDED
Status: DISCONTINUED | OUTPATIENT
Start: 2018-09-12 | End: 2018-09-12 | Stop reason: HOSPADM

## 2018-09-12 RX ORDER — NALOXONE HYDROCHLORIDE 0.4 MG/ML
0.1 INJECTION, SOLUTION INTRAMUSCULAR; INTRAVENOUS; SUBCUTANEOUS AS NEEDED
Status: DISCONTINUED | OUTPATIENT
Start: 2018-09-12 | End: 2018-09-12 | Stop reason: HOSPADM

## 2018-09-12 RX ORDER — KETAMINE HYDROCHLORIDE 10 MG/ML
INJECTION, SOLUTION INTRAMUSCULAR; INTRAVENOUS AS NEEDED
Status: DISCONTINUED | OUTPATIENT
Start: 2018-09-12 | End: 2018-09-12 | Stop reason: HOSPADM

## 2018-09-12 RX ORDER — FAMOTIDINE 10 MG/ML
20 INJECTION INTRAVENOUS ONCE
Status: COMPLETED | OUTPATIENT
Start: 2018-09-12 | End: 2018-09-12

## 2018-09-12 RX ORDER — SCOLOPAMINE TRANSDERMAL SYSTEM 1 MG/1
1 PATCH, EXTENDED RELEASE TRANSDERMAL ONCE
Status: DISCONTINUED | OUTPATIENT
Start: 2018-09-12 | End: 2018-09-14 | Stop reason: HOSPADM

## 2018-09-12 RX ORDER — MIDAZOLAM HYDROCHLORIDE 1 MG/ML
INJECTION, SOLUTION INTRAMUSCULAR; INTRAVENOUS AS NEEDED
Status: DISCONTINUED | OUTPATIENT
Start: 2018-09-12 | End: 2018-09-12 | Stop reason: HOSPADM

## 2018-09-12 RX ORDER — DEXTROSE 50 % IN WATER (D50W) INTRAVENOUS SYRINGE
25-50 AS NEEDED
Status: DISCONTINUED | OUTPATIENT
Start: 2018-09-12 | End: 2018-09-12 | Stop reason: HOSPADM

## 2018-09-12 RX ORDER — CEFAZOLIN SODIUM 2 G/50ML
2 SOLUTION INTRAVENOUS EVERY 8 HOURS
Status: COMPLETED | OUTPATIENT
Start: 2018-09-12 | End: 2018-09-13

## 2018-09-12 RX ORDER — ZOLPIDEM TARTRATE 5 MG/1
5 TABLET ORAL
Status: DISCONTINUED | OUTPATIENT
Start: 2018-09-12 | End: 2018-09-14

## 2018-09-12 RX ORDER — METFORMIN HYDROCHLORIDE 500 MG/1
1000 TABLET ORAL
Status: DISCONTINUED | OUTPATIENT
Start: 2018-09-12 | End: 2018-09-14 | Stop reason: HOSPADM

## 2018-09-12 RX ORDER — DEXAMETHASONE SODIUM PHOSPHATE 4 MG/ML
INJECTION, SOLUTION INTRA-ARTICULAR; INTRALESIONAL; INTRAMUSCULAR; INTRAVENOUS; SOFT TISSUE AS NEEDED
Status: DISCONTINUED | OUTPATIENT
Start: 2018-09-12 | End: 2018-09-12 | Stop reason: HOSPADM

## 2018-09-12 RX ORDER — GLIMEPIRIDE 2 MG/1
4 TABLET ORAL 2 TIMES DAILY WITH MEALS
Status: DISCONTINUED | OUTPATIENT
Start: 2018-09-12 | End: 2018-09-14 | Stop reason: HOSPADM

## 2018-09-12 RX ORDER — LIDOCAINE HYDROCHLORIDE AND EPINEPHRINE 20; 5 MG/ML; UG/ML
INJECTION, SOLUTION EPIDURAL; INFILTRATION; INTRACAUDAL; PERINEURAL AS NEEDED
Status: DISCONTINUED | OUTPATIENT
Start: 2018-09-12 | End: 2018-09-12 | Stop reason: HOSPADM

## 2018-09-12 RX ORDER — DIPHENHYDRAMINE HCL 25 MG
25 CAPSULE ORAL
Status: DISCONTINUED | OUTPATIENT
Start: 2018-09-12 | End: 2018-09-14 | Stop reason: HOSPADM

## 2018-09-12 RX ORDER — NEOSTIGMINE METHYLSULFATE 5 MG/5 ML
SYRINGE (ML) INTRAVENOUS AS NEEDED
Status: DISCONTINUED | OUTPATIENT
Start: 2018-09-12 | End: 2018-09-12 | Stop reason: HOSPADM

## 2018-09-12 RX ORDER — OMEPRAZOLE 20 MG/1
20 CAPSULE, DELAYED RELEASE ORAL DAILY
Status: DISCONTINUED | OUTPATIENT
Start: 2018-09-13 | End: 2018-09-14 | Stop reason: HOSPADM

## 2018-09-12 RX ORDER — OXYCODONE HYDROCHLORIDE 5 MG/1
10 TABLET ORAL
Status: DISCONTINUED | OUTPATIENT
Start: 2018-09-12 | End: 2018-09-13

## 2018-09-12 RX ORDER — DEXAMETHASONE SODIUM PHOSPHATE 4 MG/ML
4 INJECTION, SOLUTION INTRA-ARTICULAR; INTRALESIONAL; INTRAMUSCULAR; INTRAVENOUS; SOFT TISSUE ONCE
Status: COMPLETED | OUTPATIENT
Start: 2018-09-12 | End: 2018-09-12

## 2018-09-12 RX ADMIN — LIDOCAINE HYDROCHLORIDE 40 MG: 20 INJECTION, SOLUTION EPIDURAL; INFILTRATION; INTRACAUDAL; PERINEURAL at 12:22

## 2018-09-12 RX ADMIN — LIDOCAINE HYDROCHLORIDE AND EPINEPHRINE 5 ML: 20; 5 INJECTION, SOLUTION EPIDURAL; INFILTRATION; INTRACAUDAL; PERINEURAL at 12:05

## 2018-09-12 RX ADMIN — FAMOTIDINE 20 MG: 10 INJECTION, SOLUTION INTRAVENOUS at 11:27

## 2018-09-12 RX ADMIN — DEXAMETHASONE SODIUM PHOSPHATE 4 MG: 4 INJECTION, SOLUTION INTRAMUSCULAR; INTRAVENOUS at 11:51

## 2018-09-12 RX ADMIN — GLIMEPIRIDE 4 MG: 2 TABLET ORAL at 19:00

## 2018-09-12 RX ADMIN — Medication 2.5 MG: at 14:28

## 2018-09-12 RX ADMIN — INSULIN LISPRO 2 UNITS: 100 INJECTION, SOLUTION INTRAVENOUS; SUBCUTANEOUS at 19:59

## 2018-09-12 RX ADMIN — MECLIZINE 25 MG: 12.5 TABLET ORAL at 11:25

## 2018-09-12 RX ADMIN — FENTANYL CITRATE 25 MCG: 50 INJECTION, SOLUTION INTRAMUSCULAR; INTRAVENOUS at 12:22

## 2018-09-12 RX ADMIN — ACETAMINOPHEN 1000 MG: 500 TABLET, FILM COATED ORAL at 11:25

## 2018-09-12 RX ADMIN — SODIUM CHLORIDE, SODIUM LACTATE, POTASSIUM CHLORIDE, AND CALCIUM CHLORIDE 125 ML/HR: 600; 310; 30; 20 INJECTION, SOLUTION INTRAVENOUS at 11:26

## 2018-09-12 RX ADMIN — ROCURONIUM BROMIDE 30 MG: 10 INJECTION, SOLUTION INTRAVENOUS at 12:34

## 2018-09-12 RX ADMIN — SODIUM CHLORIDE, SODIUM LACTATE, POTASSIUM CHLORIDE, AND CALCIUM CHLORIDE: 600; 310; 30; 20 INJECTION, SOLUTION INTRAVENOUS at 12:45

## 2018-09-12 RX ADMIN — GLYCOPYRROLATE 0.5 MG: 0.2 INJECTION INTRAMUSCULAR; INTRAVENOUS at 14:28

## 2018-09-12 RX ADMIN — METFORMIN HYDROCHLORIDE 1000 MG: 500 TABLET, FILM COATED ORAL at 19:00

## 2018-09-12 RX ADMIN — PROPOFOL 100 MG: 10 INJECTION, EMULSION INTRAVENOUS at 12:22

## 2018-09-12 RX ADMIN — ONDANSETRON 4 MG: 2 INJECTION INTRAMUSCULAR; INTRAVENOUS at 14:10

## 2018-09-12 RX ADMIN — FENTANYL CITRATE 75 MCG: 50 INJECTION, SOLUTION INTRAMUSCULAR; INTRAVENOUS at 12:27

## 2018-09-12 RX ADMIN — SODIUM CHLORIDE, SODIUM LACTATE, POTASSIUM CHLORIDE, AND CALCIUM CHLORIDE 1000 ML: 600; 310; 30; 20 INJECTION, SOLUTION INTRAVENOUS at 11:26

## 2018-09-12 RX ADMIN — Medication 10 MG: at 12:59

## 2018-09-12 RX ADMIN — Medication 10 MG: at 12:40

## 2018-09-12 RX ADMIN — INSULIN LISPRO 1 UNITS: 100 INJECTION, SOLUTION INTRAVENOUS; SUBCUTANEOUS at 16:04

## 2018-09-12 RX ADMIN — TRANEXAMIC ACID 1 G: 100 INJECTION, SOLUTION INTRAVENOUS at 14:28

## 2018-09-12 RX ADMIN — Medication 10 MG: at 13:14

## 2018-09-12 RX ADMIN — TRANEXAMIC ACID 1 G: 100 INJECTION, SOLUTION INTRAVENOUS at 12:35

## 2018-09-12 RX ADMIN — Medication 10 MG: at 13:20

## 2018-09-12 RX ADMIN — DEXAMETHASONE SODIUM PHOSPHATE 8 MG: 4 INJECTION, SOLUTION INTRA-ARTICULAR; INTRALESIONAL; INTRAMUSCULAR; INTRAVENOUS; SOFT TISSUE at 14:10

## 2018-09-12 RX ADMIN — Medication 2 G: at 12:27

## 2018-09-12 RX ADMIN — ONDANSETRON 4 MG: 4 TABLET, ORALLY DISINTEGRATING ORAL at 11:25

## 2018-09-12 RX ADMIN — ROPIVACAINE HYDROCHLORIDE 30 ML: 5 INJECTION, SOLUTION EPIDURAL; INFILTRATION; PERINEURAL at 12:08

## 2018-09-12 RX ADMIN — CEFAZOLIN SODIUM 2 G: 2 SOLUTION INTRAVENOUS at 18:59

## 2018-09-12 RX ADMIN — KETAMINE HYDROCHLORIDE 20 MG: 10 INJECTION, SOLUTION INTRAMUSCULAR; INTRAVENOUS at 12:02

## 2018-09-12 RX ADMIN — MIDAZOLAM HYDROCHLORIDE 2 MG: 1 INJECTION, SOLUTION INTRAMUSCULAR; INTRAVENOUS at 12:02

## 2018-09-12 NOTE — PERIOP NOTES
Patient assigned to room 224. GORGE Ellis assuming care. Blood pressure 130/59, pulse 74, temperature 97.5 °F (36.4 °C), resp. rate 18, height 5' 2\" (1.575 m), weight 108.6 kg (239 lb 8 oz), SpO2 96 %. Dual skin assessment completed.

## 2018-09-12 NOTE — PERIOP NOTES
TRANSFER - OUT REPORT:    Verbal report given to GORGE Ellis(name) on August Compton  being transferred to room 224(unit) for routine post - op       Report consisted of patients Situation, Background, Assessment and   Recommendations(SBAR). Information from the following report(s) SBAR was reviewed with the receiving nurse. Lines:   Peripheral IV 09/12/18 Right Hand (Active)   Site Assessment Clean, dry, & intact 9/12/2018  3:15 PM   Phlebitis Assessment 0 9/12/2018  3:15 PM   Infiltration Assessment 0 9/12/2018  3:15 PM   Dressing Status Clean, dry, & intact 9/12/2018  3:15 PM   Dressing Type Transparent 9/12/2018  3:15 PM   Hub Color/Line Status Green; Infusing 9/12/2018  3:15 PM        Opportunity for questions and clarification was provided.       Patient transported with:   O2 @ 2 liters

## 2018-09-12 NOTE — OP NOTES
[place] [address], [address], [phone]    REVERSE TOTAL SHOULDER REPLACEMENT    Patient: Haresh Jay MRN: [id number]  SSN: [id number]    YOB: 1940  Age: 70 y.o. Sex: female      Date of Surgery: 9/12/2018  Preoperative Diagnosis: LEFT SHOULDER OSTEOARTHRITIS   Postoperative Diagnosis: LEFT SHOULDER OSTEOARTHRITIS   Location: [facility]  Surgeon: Keith Green MD  Physician Assistant: WALLACE Mclean was medically necessary for holding of retractors for exposure and to complete the case. Assistant: Circ-1: Agustin Vega RN  Circ-Relief: Ki Camp RN; Erum Espinoza RN  Scrub Tech-1: Bulmaro Taylor  Scrub Tech-Relief: Florin Ayala  Scrub RN-1: Pippa Mathew RN    Anesthesia: General + regional block + local    Procedure: Total left ReverseShoulder Arthroplasty (CPT: 61134)    Findings: Degenerative joint disease of the left shoulder and irreperable rotator cuff tear    Estimated Blood Loss: 200 mL    Fluids: see anesthesia record    Complications: None    Specimens: None    Cultures: None    Antibiotics: 2 g Ancef. Tranexamic Acid: 1 g IV at start of case, 1 g IV at closure    Diluted Exparel: 20 mL of Exparel (13 mg/mL) mixed with 40 mL Normal Saline    Implants:   Implant Name Type Inv.  Item Serial No.  Lot No. LRB No. Used Action   BASEPLT W/ADAPTER LUI 25MM -- COMPREHENSIVE - [id number]  BASEPLT W/ADAPTER LUI 25MM -- COMPREHENSIVE  [employer] [id number] Left 1 Implanted   SCR CTRL RVS 6.5X25MM STRL/RST -- COMPREHENSIVE - [id number]  SCR CTRL RVS 6.5X25MM STRL/RST -- COMPREHENSIVE  [employer] [id number] Left 1 Implanted   SCR LCK 3.5 HEX 4.75X30 STRL -- COMPREHENSIVE - [id number]  SCR LCK 3.5 HEX 4.75X30 STRL -- COMPREHENSIVE  [employer] [id number] Left 1 Implanted   SCR NLCK 3.5 HEX 4.75X20 STRL -- COMPREHENSIVE - [id number]  SCR NLCK 3.5 HEX 4.75X20 STRL -- COMPREHENSIVE  Dedra Raghu [employer] [id number] Left 1 Implanted   SCR LCK 3.5 HEX 4.75X15 STRL -- COMPREHENSIVE - [id number]  SCR LCK 3.5 HEX 4.75X15 STRL -- COMPREHENSIVE  [employer] [id number] Left 1 Implanted   Glenosphere    [employer] [id number] Left 1 Implanted   STEM HUM JOSE CARLOS MINI 9MM -- COMPREHENSIVE - [id number]  STEM HUM JOSE CARLOS MINI 9MM -- COMPREHENSIVE  [employer] [id number] Left 1 Implanted   HUM TRAY RVS SHLDR 44MM -- COMPREHENSIVE - [id number]  HUM TRAY RVS SHLDR 44MM -- COMPREHENSIVE  [employer] [id number] Left 1 Implanted   BEARING HUM STD E1 44-36MM --  - [id number]   BEARING HUM STD E1 44-36MM --    [employer] [id number] Left 1 Implanted       Patient Condition: Stable. INDICATIONS: This 70y.o.-year-old female has had pain in the left shoulder for over 6 months and has become functionally disabled with respect to the activities of daily living. The pain is increased with activities of daily living. The pain and dysfunction were not releaved by at least three months of conservative therapy such as NSAIDS (ibuprofen, aleve), analgesics (tylenol), structured flexibility and muscle strengthening physical therapy exercises, activity restrictions and injection with corticosteroid. The radiographs showed preserved glenohumeral joint space, but MRI demonstrates rotator cuff tear with retraction, fat atrophy of rotator cuff muscles and degenerative changes. A left reverse total shoulder replacement has been recommended. The risks and the possible complications of this surgery and anesthesia including, but not exclusive to, bleeding, infection, dislocation, fracture, deep venous thrombosis, pulmonary embolus, nerve and vascular injury, possible need for other procedures, and possibly death have been explained to the patient.  The patient accepts these risks for the benefits of shoulder replacement over the failure of non-operative care to provide adequate pain relief and improve their functional disability. The patients medical problems have been addressed by their primary care physician and are deemed stable and as well controlled as possible. Inpatient hospital care was medically necessary, reasonable, and appropriate. This is a medically necessary procedure. As such, without a use of a surgical assistant to retract soft tissues, protect vital neuro-vascular structures and assist in the technical aspects of the operation, this procedure would not have been possible. Therefore this assistant was medically necessary. DESCRIPTION OF FINDINGS:  Massive rotator cuff tear of the left shoulder. DESCRIPTION OF PROCEDURE:    After the risks and benefits of surgery were discussed, informed consent was obtained. The patient was identified in the preoperative holding area and the operative extremity was marked. The patient was taken to the operating room and placed in the supine position. Following a regional brachial plexus block, general anesthesia was performed. IV antibiotics were given. After verification of the appropriate operative site from the consent form, with confirmation of surgeon, anesthesia and nursing teams, the patient was positioned in a semi-upright beach-chair position; the bony prominences were well padded; and the left arm was prepped and draped in a sterile fashion using Chloraprep. A formal timeout was performed. 20 mL of 1% Lidocaine with epinephrine was injected in line with the planned incision. An anterior skin incision was in line with the deltopectoral interval - beginning near the coracoid and extending distally and laterally. Dissection was made down through the subcutaneous tissue. The cephalic vein was diminutive and it was coagulated. Dissection continued through the deltopectoral interval.  Subdeltoid adhesions were released using blunt dissection.   The rotator cuff was found to be deficient which was expected based on preoperative MRI so the decision was made to procede with a reverse total shoulder arthroplasty. The long head of the biceps tendon was identified in the bicipital groove. It was tagged with a #5 Ethibond and tenodesed to the pec tendon. The subscapularis tendon was identified. The Subscap tendon was elevated from its insertion on the lesser tuberosity. A #5 Ethibond stitch was placed through the subscapularis tendon to be used for retraction during the case. The rotator cuff interval was incised up to the level of the glenoid. The capsule along the inferior neck was released as the shoulder was dislocated anteriorly. Humeral preparation was begun using intramedullary reaming to the point of cortical chatter at 10 mm followed by placement of the intramedullary cutting guide. Humeral head osteotomy was performed just proximal to the level of the cuff insertion posteriorly. Once the humeral head was removed, attention was turned toward glenoid preparation. Retractors were placed to optimize glenoid visualization. The remaining biceps stump was excised. The labrum was excised from 12 oclock - around anteriorly and also the inferior labrum. The posterior labrum was left intact. The centerpoint of the glenoid was determined and the central guidewire was placed with 10 degrees inferior tilt. The reamer was placed over the guidewire and the glenoid surface was reamed. Trial baseplate was placed. The glenoid was impacted into place. Using the Sequence [employer] instrumentation, after appropriate drilling, the 6.5 center cortical screw was inserted followed by Inferior and Anterior locking screws. A cortical nonlocking screw was placed superiorly. No screw was placed posteriorly. Attention returned to completion of humeral preparation. Broaching was performed up to the best fit using the version guide to set the humeral broach in approximately 30 degrees of retroversion in relation to the forearm.   Trialing of the glenosphere and humeral components was performed to optimize range of motion and stability. At 90 degrees of abduction the shoulder could be externally rotated 90 degrees and internally rotated 90 degrees. Trial implants were then removed. Two drill holes were placed in the anterolateral proximal humerus allowing for some medial translation of the subscapularis insertion and two #5 Ethibond sutures were placed through these holes for subscapularis repair. The real humeral components were impacted into place. Stability and range of motion was again checked and found to be unchanged. The wound was copiously irrigated and hemostasis was achieved. The subscapularis tendon was repaired using #5 Ethibond sutures which had been passed through the humeral bone tunnels. The long head of the biceps was approximated to the subscap tendon with 0 Vicryl to promote healing of both of these soft tissue structures. 30 mL 0.25 % Marcaine and 60 mL of diluted Exparel was injected in the periarticular soft tissues. A subfascial HV drain was placed. A layered closure was performed with approximation of the delto-pec interval with 0 Vicryl; the subcutaneous tissue was closed with 0 Vicryl followed by interrupted 3-0 Vicryl for the deep subcuticular layer followed by running subcuticular skin closure using a 3-0 MONOCRYL. PRINEO was placed and sterile dressings were applied. The patient was repositioned supine and awakened from anesthesia. All sponge and needle counts were correct.     Iliana Taveras MD

## 2018-09-12 NOTE — PERIOP NOTES
Reviewed PTA medication list with patient/caregiver and patient/caregiver denies any additional medications. Patient admits to having a responsible adult care for them for at least 24 hours after surgery.     Dual skin assessment completed by Camila PENNINGTON and Randolph Marcus RN. Admission Physical Assessment performed by Irvin Riley RN her findings documented by undersigned.

## 2018-09-12 NOTE — IP AVS SNAPSHOT
[address] [place] [address] 
[phone] Patient: Maribel Osorio MRN: [id number] QTZ:5[birth year] About your hospitalization You were admitted on:  September 12, 2018 You last received care in the:  [facility] [address] You were discharged on:  September 14, 2018 Why you were hospitalized Your primary diagnosis was:  Not on File Your diagnoses also included:  H/O Total Shoulder Replacement, Left Follow-up Information Follow up With Details Comments Contact Info Dr Rc Najera On 9/25/2018 Follow up appointment @ 10:00am [address] [facility] [address] [facility] 
[phone] Tony Padron MD   [address] [place] [address],[address] [address] 
[phone] Kellee Eduardo MD   [address] [facility] [address] [place] [address] [address] 
[phone] [place] to continue managing your healthcare needs. [phone] Discharge Orders None A check yris indicates which time of day the medication should be taken. My Medications START taking these medications Instructions Each Dose to Equal  
 Morning Noon Evening Bedtime  
 docusate sodium 50 mg capsule Commonly known as:  Eric Sandhu Your last dose was: Your next dose is: Take 2 Caps by mouth three (3) times daily as needed for Constipation for up to 90 days. 100 mg HYDROmorphone 2 mg tablet Commonly known as:  DILAUDID Your last dose was: Your next dose is:    
   
   
 1-2 tabs by mouth every 4-6 hours as needed for pain CONTINUE taking these medications Instructions Each Dose to Equal  
 Morning Noon Evening Bedtime  
 allopurinol 100 mg tablet Commonly known as:  Ardie Fleischer Your last dose was: Your next dose is:    
   
   
 300 mg daily.   
 300 mg  
 amLODIPine 5 mg tablet Commonly known as:  Cuca Spruce Your last dose was: Your next dose is: Take 5 mg by mouth nightly as needed. 5 mg  
    
   
   
   
  
 aspirin delayed-release 81 mg tablet Your last dose was: Your next dose is: Take  by mouth as needed for Pain. atenolol 25 mg tablet Commonly known as:  TENORMIN Your last dose was: Your next dose is: Take 25 mg by mouth daily. 25 mg  
    
   
   
   
  
 calcium-cholecalciferol (D3) tablet Commonly known as:  CALTRATE 600+D Your last dose was: Your next dose is: Take 1 Tab by mouth two (2) times a day. 1 Tab  
    
   
   
   
  
 glimepiride 2 mg tablet Commonly known as:  AMARYL Your last dose was: Your next dose is: Take 4 mg by mouth two (2) times a day. 4 mg  
    
   
   
   
  
 indomethacin 25 mg capsule Commonly known as:  INDOCIN Your last dose was: Your next dose is: Take 25 mg by mouth three (3) times daily as needed. 25 mg  
    
   
   
   
  
 ketoconazole 2 % shampoo Commonly known as:  NIZORAL Your last dose was: Your next dose is:    
   
   
 Apply  to affected area daily as needed for Itching. losartan-hydroCHLOROthiazide 100-25 mg per tablet Commonly known as:  HYZAAR Your last dose was: Your next dose is: Take 1 Tab by mouth daily. 1 Tab * metFORMIN 500 mg tablet Commonly known as:  GLUCOPHAGE Your last dose was: Your next dose is: Take 500 mg by mouth daily (with breakfast). 500 mg  
    
   
   
   
  
 * metFORMIN 1,000 mg tablet Commonly known as:  GLUCOPHAGE Your last dose was: Your next dose is: Take 1,000 mg by mouth daily (with dinner).   
 1000 mg  
 mupirocin calcium 2 % topical cream  
Commonly known as:  [facility] Your last dose was: Your next dose is:    
   
   
 Apply  to affected area two (2) times a day. omega 3-DHA-EPA-fish oil 1,000 mg (120 mg-180 mg) capsule Your last dose was: Your next dose is: Take 1 Cap by mouth daily. 1 Cap  
    
   
   
   
  
 omeprazole 20 mg capsule Commonly known as:  PRILOSEC Your last dose was: Your next dose is:    
   
   
      
   
   
   
  
 polyvinyl alcohol-povidon(PF) 1.4-0.6 % ophthalmic solution Commonly known as:  REFRESH CLASSIC Your last dose was: Your next dose is:    
   
   
 Administer 1-2 Drops to both eyes as needed. 1-2 Drop  
    
   
   
   
  
 potassium chloride SR 10 mEq tablet Commonly known as:  KLOR-CON 10 Your last dose was: Your next dose is: Take 20 mEq by mouth three (3) times daily. 20 mEq PRENATAL GUMMY 400 mcg-35 mg -25 mg-5 mg Chew Generic drug:  PNV62-FA-om3-dha-epa-fish oil Your last dose was: Your next dose is: Take  by mouth. VOLTAREN 1 % Gel Generic drug:  diclofenac Your last dose was: Your next dose is: * Notice: This list has 2 medication(s) that are the same as other medications prescribed for you. Read the directions carefully, and ask your doctor or other care provider to review them with you. Where to Get Your Medications Information on where to get these meds will be given to you by the nurse or doctor. ! Ask your nurse or doctor about these medications  
  docusate sodium 50 mg capsule HYDROmorphone 2 mg tablet Opioid Education  Prescription Opioids: What You Need to Know: 
 
Prescription opioids can be used to help relieve moderate-to-severe pain and are often prescribed following a surgery or injury, or for certain health conditions. These medications can be an important part of treatment but also come with serious risks. Opioids are strong pain medicines. Examples include hydrocodone, oxycodone, fentanyl, and morphine. Heroin is an example of an illegal opioid. It is important to work with your health care provider to make sure you are getting the safest, most effective care. WHAT ARE THE RISKS AND SIDE EFFECTS OF OPIOID USE? Prescription opioids carry serious risks of addiction and overdose, especially with prolonged use. An opioid overdose, often marked by slow breathing, can cause sudden death. The use of prescription opioids can have a number of side effects as well, even when taken as directed. · Tolerance-meaning you might need to take more of a medication for the same pain relief · Physical dependence-meaning you have symptoms of withdrawal when the medication is stopped. Withdrawal symptoms can include nausea, sweating, chills, diarrhea, stomach cramps, and muscle aches. Withdrawal can last up to several weeks, depending on which drug you took and how long you took it. · Increased sensitivity to pain · Constipation · Nausea, vomiting, and dry mouth · Sleepiness and dizziness · Confusion · Depression · Low levels of testosterone that can result in lower sex drive, energy, and strength · Itching and sweating RISKS ARE GREATER WITH:      
· History of drug misuse, substance use disorder, or overdose · Mental health conditions (such as depression or anxiety) · Sleep apnea · Older age (72 years or older) · Pregnancy Avoid alcohol while taking prescription opioids. Also, unless specifically advised by your health care provider, medications to avoid include: · Benzodiazepines (such as Xanax or Valium) · Muscle relaxants (such as Soma or Flexeril) · Hypnotics (such as Ambien or Lunesta) · Other prescription opioids KNOW YOUR OPTIONS Talk to your health care provider about ways to manage your pain that don't involve prescription opioids. Some of these options may actually work better and have fewer risks and side effects. Options may include: 
· Pain relievers such as acetaminophen, ibuprofen, and naproxen · Some medications that are also used for depression or seizures · Physical therapy and exercise · Counseling to help patients learn how to cope better with triggers of pain and stress. · Application of heat or cold compress · Massage therapy · Relaxation techniques Be Informed Make sure you know the name of your medication, how much and how often to take it, and its potential risks & side effects. IF YOU ARE PRESCRIBED OPIOIDS FOR PAIN: 
· Never take opioids in greater amounts or more often than prescribed. Remember the goal is not to be pain-free but to manage your pain at a tolerable level. · Follow up with your primary care provider to: · Work together to create a plan on how to manage your pain. · Talk about ways to help manage your pain that don't involve prescription opioids. · Talk about any and all concerns and side effects. · Help prevent misuse and abuse. · Never sell or share prescription opioids · Help prevent misuse and abuse. · Store prescription opioids in a secure place and out of reach of others (this may include visitors, children, friends, and family). · Safely dispose of unused/unwanted prescription opioids: Find your community drug take-back program or your pharmacy mail-back program, or flush them down the toilet, following guidance from the Food and Drug Administration (www.fda.gov/Drugs/ResourcesForYou). · Visit www.cdc.gov/drugoverdose to learn about the risks of opioid abuse and overdose. · If you believe you may be struggling with addiction, tell your health care provider and ask for guidance or call [employer] at 3-533-170-MGTK. [facility]ructions [facility] Patient Discharge Instructions Suzan Thomas / [id number] : 1940 Admitted 2018 Discharged: 2018 IF YOU HAVE ANY PROBLEMS ONCE YOU ARE AT HOME CALL THE FOLLOWING NUMBERS:  
Main office number: [phone] Your follow up appointment to see Dr. Glenn Dunbar is scheduled in 1-2 weeks. If you are unsure of your appointment date call the office at [phone]. Take Home Medications · Resume your home medictions as directed · A prescription for pain medication has been given · It is important that you take the medication exactly as they are prescribed. · Keep your medication in the bottles provided by the pharmacist and keep a list of the medication names, dosages, and times to be taken in your wallet. · Do not take other medications without consulting your doctor. · Note:  If you have already received and/or filled a prescription for one or more of the medications you've received a prescription for when leaving the hospital, you may disregard the duplicate prescription. What to do at [facility] Resume your prehospital diet. If you have excessive nausea or vomitting call your doctor's office Perform Shoulder Pendulum exercises several times daily. Wear the sling most of the time. Take your arm out of the sling to move your elbow, wrist and fingers. Do not  anything with your arm heavier than a pencil. Keep incision DRY. You may need to sponge bathe to avoid getting the incision wet. When to Call - Call if you have a temperature greater then 101 
- Unable to keep food down - Are unable to bear any wieght  
- Need a pain medication refill Information obtained by : 
I understand that if any problems occur once I am at home I am to contact my physician. I understand and acknowledge receipt of the instructions indicated above. Physician's or R.N.'s Signature                                                                  Date/Time Patient or Representative Signature                                                          Date/Time Responsa Announcement We are excited to announce that we are making your provider's discharge notes available to you in Responsa. You will see these notes when they are completed and signed by the physician that discharged you from your recent hospital stay. If you have any questions or concerns about any information you see in Responsa, please call the Health Information Department where you were seen or reach out to your Primary Care Provider for more information about your plan of care. Introducing [facility]! Dear Breanne Schultz: Thank you for requesting a Responsa account. Our records indicate that you already have an active Responsa account. You can access your account anytime at [email or web address] Did you know that you can access your hospital and ER discharge instructions at any time in Responsa? You can also review all of your test results from your hospital stay or ER visit. Additional Information If you have questions, please visit the Frequently Asked Questions section of the Responsa website at [email or web address] Remember, Responsa is NOT to be used for urgent needs. For medical emergencies, dial 911. Now available from your iPhone and Android! Introducing [employer] As a [facility] patient, I wanted to make you aware of our electronic visit tool called Sp [employer]. [employer] 24/7 allows you to connect within minutes with a medical provider 24 hours a day, seven days a week via a mobile device or tablet or logging into a secure website from your computer. You can access [employer] from anywhere in the [place]. A virtual visit might be right for you when you have a simple condition and feel like you just dont want to get out of bed, or cant get away from work for an appointment, when your regular [employer] provider is not available (evenings, weekends or holidays), or when youre out of town and need minor care. Electronic visits cost only $49 and if the [employer] 24/7 provider determines a prescription is needed to treat your condition, one can be electronically transmitted to a nearby pharmacy*. Please take a moment to enroll today if you have not already done so. The enrollment process is free and takes just a few minutes. To enroll, please download the [employer] 24/7 anna to your tablet or phone, or visit [email or web address] to enroll on your computer. And, as an [address] [place] [address] patient with a [employer] account, the results of your visits will be scanned into your electronic medical record and your primary care provider will be able to view the scanned results. We urge you to continue to see your regular [employer] provider for your ongoing medical care. And while your primary care provider may not be the one available when you seek a [employer] virtual visit, the peace of mind you get from getting a real diagnosis real time can be priceless. For more information on [employer], view our Frequently Asked Questions (FAQs) at [email or web address]. Sincerely, 
 
Marah Batista MD 
Chief Medical Officer Subhash Elizondo *:  certain medications cannot be prescribed via [employer] Providers Seen During Your Hospitalization Provider Specialty Primary office phone Dany Low MD Orthopedic Surgery [phone] Your Primary Care Physician (PCP) Primary Care Physician Office Phone Office Fax Deyanira Peralta [phone] You are allergic to the following Allergen Reactions Sulfa (Sulfonamide Antibiotics) Rash Recent Documentation Height Weight BMI OB Status Smoking Status 1.575 m 108.6 kg 43.81 kg/m2 Hysterectomy Never Smoker Emergency Contacts Name Discharge Info Relation Home Work Mobile Gavino Graf DISCHARGE CAREGIVER [3] Spouse [3] [phone] Patient Belongings The following personal items are in your possession at time of discharge: 
  Dental Appliances: None  Visual Aid: Glasses, With patient      Home Medications: None   Jewelry: None  Clothing: Footwear, Pants, Shirt, Undergarments (given to spouse)    Other Valuables: Purse (given to spouse) Please provide this summary of care documentation to your next provider. Signatures-by signing, you are acknowledging that this After Visit Summary has been reviewed with you and you have received a copy. Patient Signature:  ____________________________________________________________ Date:  ____________________________________________________________  
  
Jadiel Aguirre Provider Signature:  ____________________________________________________________ Date:  ____________________________________________________________

## 2018-09-12 NOTE — IP AVS SNAPSHOT
[address] [address] 
[phone] Patient: Lora Cortez MRN: [id number] BPM:0/1/7841 A check yris indicates which time of day the medication should be taken. My Medications START taking these medications Instructions Each Dose to Equal  
 Morning Noon Evening Bedtime  
 docusate sodium 50 mg capsule Commonly known as:  Ltanya Clay Your last dose was: Your next dose is: Take 2 Caps by mouth three (3) times daily as needed for Constipation for up to 90 days. 100 mg HYDROmorphone 2 mg tablet Commonly known as:  DILAUDID Your last dose was: Your next dose is:    
   
   
 1-2 tabs by mouth every 4-6 hours as needed for pain CONTINUE taking these medications Instructions Each Dose to Equal  
 Morning Noon Evening Bedtime  
 allopurinol 100 mg tablet Commonly known as:  Sylvie Emmanuel Your last dose was: Your next dose is:    
   
   
 300 mg daily. 300 mg  
    
   
   
   
  
 amLODIPine 5 mg tablet Commonly known as:  Augie Weir Your last dose was: Your next dose is: Take 5 mg by mouth nightly as needed. 5 mg  
    
   
   
   
  
 aspirin delayed-release 81 mg tablet Your last dose was: Your next dose is: Take  by mouth as needed for Pain. atenolol 25 mg tablet Commonly known as:  TENORMIN Your last dose was: Your next dose is: Take 25 mg by mouth daily. 25 mg  
    
   
   
   
  
 calcium-cholecalciferol (D3) tablet Commonly known as:  CALTRATE 600+D Your last dose was: Your next dose is: Take 1 Tab by mouth two (2) times a day. 1 Tab  
    
   
   
   
  
 glimepiride 2 mg tablet Commonly known as:  AMARYL Your last dose was: Your next dose is: Take 4 mg by mouth two (2) times a day. 4 mg  
    
   
   
   
  
 indomethacin 25 mg capsule Commonly known as:  INDOCIN Your last dose was: Your next dose is: Take 25 mg by mouth three (3) times daily as needed. 25 mg  
    
   
   
   
  
 ketoconazole 2 % shampoo Commonly known as:  NIZORAL Your last dose was: Your next dose is:    
   
   
 Apply  to affected area daily as needed for Itching. losartan-hydroCHLOROthiazide 100-25 mg per tablet Commonly known as:  HYZAAR Your last dose was: Your next dose is: Take 1 Tab by mouth daily. 1 Tab * metFORMIN 500 mg tablet Commonly known as:  GLUCOPHAGE Your last dose was: Your next dose is: Take 500 mg by mouth daily (with breakfast). 500 mg  
    
   
   
   
  
 * metFORMIN 1,000 mg tablet Commonly known as:  GLUCOPHAGE Your last dose was: Your next dose is: Take 1,000 mg by mouth daily (with dinner). 1000 mg  
    
   
   
   
  
 mupirocin calcium 2 % topical cream  
Commonly known as:  [facility] Your last dose was: Your next dose is:    
   
   
 Apply  to affected area two (2) times a day. omega 3-DHA-EPA-fish oil 1,000 mg (120 mg-180 mg) capsule Your last dose was: Your next dose is: Take 1 Cap by mouth daily. 1 Cap  
    
   
   
   
  
 omeprazole 20 mg capsule Commonly known as:  PRILOSEC Your last dose was: Your next dose is:    
   
   
      
   
   
   
  
 polyvinyl alcohol-povidon(PF) 1.4-0.6 % ophthalmic solution Commonly known as:  REFRESH CLASSIC Your last dose was: Your next dose is:    
   
   
 Administer 1-2 Drops to both eyes as needed. 1-2 Drop  
    
   
   
   
  
 potassium chloride SR 10 mEq tablet Commonly known as:  KLOR-CON 10  
   
 Your last dose was: Your next dose is: Take 20 mEq by mouth three (3) times daily. 20 mEq PRENATAL GUMMY 400 mcg-35 mg -25 mg-5 mg Chew Generic drug:  PNV62-FA-om3-dha-epa-fish oil Your last dose was: Your next dose is: Take  by mouth. VOLTAREN 1 % Gel Generic drug:  diclofenac Your last dose was: Your next dose is: * Notice: This list has 2 medication(s) that are the same as other medications prescribed for you. Read the directions carefully, and ask your doctor or other care provider to review them with you. Where to Get Your Medications Information on where to get these meds will be given to you by the nurse or doctor. ! Ask your nurse or doctor about these medications  
  docusate sodium 50 mg capsule HYDROmorphone 2 mg tablet

## 2018-09-12 NOTE — PERIOP NOTES
Received patient from [place] [address] and anesthesia provider. Reviewed surgical procedure, intraoperative course. Patient identified.

## 2018-09-12 NOTE — PROGRESS NOTES
Problem: Mobility Impaired (Adult and Pediatric)  Goal: *Acute Goals and Plan of Care (Insert Text)  Physical Therapy Goals   Initiated 9/12/2018 and to be accomplished within 3-5 day(s)  1. Patient will move from supine <> sit with S in prep for out of bed activity and change of position. 2.  Patient will perform sit<> stand with S with LRAD in prep for transfers/ambulation. 3.  Patient will transfer from bed <> chair with S with LRAD for time up in chair for completion of ADL activity. 4.  Patient will ambulate 150 feet with LRAD/S for improved functional mobility/safe discharge. 5.  Patient will ascend/descend 3-5 stairs with handrail with minimal assistance/contact guard assist for home re-entry as needed. Outcome: Progressing Towards Goal  physical Therapy EVALUATION    Patient: Dawit Lorenzo (70 y.o. female)  Date: 9/12/2018  Primary Diagnosis: LEFT SHOULDER OSTEOARTHRITIS  H/O total shoulder replacement, left  Procedure(s) (LRB):  LEFT REVERSE TOTAL SHOULDER ARTHROPLASTY. \"SPEC POP\" (N/A) Day of Surgery   Precautions: Fall    ASSESSMENT :  Based on the objective data described below, the patient presents with decrease independence in bed mobility, transfers, gait, and step negotiation. Pt seen sitting in upright chair prior to session w/ IV, heme vac, and shoulder sling. Pt reported no pain at this time. Pt educated on shoulder precautions. Pt and daughter educated on how to properly don/doff brace. Pt educated on UE therex activity in elbow, forearm, wrist, will perform pendulums tomorrow as pt as no control or feeling in the L UE at this time. Pt able to stand to take two steps w/ GB donned. Pt left sitting in upright chair after session. Pt's O2 levels continue to decrease to high 80s so O2 donned per nurse, call bell and tray in reach, nurse consulted after session. Patient will benefit from skilled intervention to address the above impairments.   Patients rehabilitation potential is considered to be Good  Factors which may influence rehabilitation potential include:   []         None noted  []         Mental ability/status  []         Medical condition  [x]         Home/family situation and support systems  [x]         Safety awareness  [x]         Pain tolerance/management  []         Other:      PLAN :  Recommendations and Planned Interventions:  [x]           Bed Mobility Training             [x]    Neuromuscular Re-Education  [x]           Transfer Training                   []    Orthotic/Prosthetic Training  [x]           Gait Training                          []    Modalities  [x]           Therapeutic Exercises          []    Edema Management/Control  [x]           Therapeutic Activities            [x]    Patient and Family Training/Education  []           Other (comment):    Frequency/Duration: Patient will be followed by physical therapy twice daily to address goals. Discharge Recommendations: Home Health  Further Equipment Recommendations for Discharge: N/A     SUBJECTIVE:   Patient stated I feel wonderful, I can't feel anything in this arm.     OBJECTIVE DATA SUMMARY:     Past Medical History:   Diagnosis Date    Arthritis     Arthropathy     Atypical glandular cells of undetermined significance (MELLY) on cervical Pap smear     Back pain     Chronic kidney disease     stones    Complex ovarian cyst     Diabetes mellitus ([facility] 2008    Endometrial cancer ([facility]     Endometrial hyperplasia     GERD (gastroesophageal reflux disease)     H/O cervical biopsy 10/26/2010    H/O knee surgery     H/O shoulder surgery     History of hysteroscopy 10/26/2010    History of staph infection     Hx of cholecystectomy     Hx of tonsillectomy     Hypertension 1988    Incisional hernia, without obstruction or gangrene     Kidney stones 03/2017    Mononeuropathy     Morbid obesity with BMI of 40.0-44.9, adult ([facility]     Other screening mammogram     Ovarian cyst     PONV (postoperative nausea and vomiting)     Post-menopausal bleeding     Postmenopausal bleeding     Sleep apnea     instructed to bring CPAP day of surgery    Urinary incontinence      Past Surgical History:   Procedure Laterality Date    BIOPSY/EXCIS CERVICAL LESN      COLONOSCOPY,DIAGNOSTIC      HX APPENDECTOMY      HX CATARACT REMOVAL Bilateral     HX CHOLECYSTECTOMY      HX HYSTERECTOMY      HX HYSTEROSCOPY      HX KNEE REPLACEMENT Bilateral     partial    HX ORTHOPAEDIC Right     carpal tunnel    HX ROTATOR CUFF REPAIR Right     HX SALPINGO-OOPHORECTOMY Bilateral     HX TONSILLECTOMY      HX TONSILLECTOMY      HX WISDOM TEETH EXTRACTION      AK ANESTH,KNEE AREA SURGERY      AK ANESTH,SURGERY OF SHOULDER      REMOVAL OF KIDNEY STONE       Barriers to Learning/Limitations: yes;  physical  Compensate with: Verbal Cues and Tactile Cues  Prior Level of Function/Home Situation:   Home Situation  Home Environment: Private residence  # Steps to Enter: 3  Rails to Enter: Yes  [employer] : Left (Pt reports she ascends the steps backwards)  One/Two Story Residence: Two story, live on 1st floor  Living Alone: No  Support Systems: Family member(s)  Patient Expects to be Discharged to[de-identified] Private residence  Current DME Used/Available at Home: [employer], straight, Walker, rolling  Critical Behavior:  Neurologic State: Alert  Orientation Level: Oriented X4  Skin Condition/Temp: Warm  Skin Integrity: Incision (comment) (left shoulder)  Skin Integumentary  Skin Color: Appropriate for ethnicity  Skin Condition/Temp: Warm  Skin Integrity: Incision (comment) (left shoulder)  Strength:    Strength: Generally decreased, functional  Tone & Sensation:   Tone: Normal  Sensation: Intact  Range Of Motion:  AROM: Generally decreased, functional  Functional Mobility:  Transfers:  Sit to Stand: Contact guard assistance  Stand to Sit: Contact guard assistance  Balance:   Sitting: Intact  Standing: Intact; Without support  Ambulation/Gait Training:  Distance (ft): 1 Feet (ft)  Assistive Device: Brace/Splint;Gait belt  Ambulation - Level of Assistance: Contact guard assistance  Gait Description (WDL): Exceptions to WDL  Gait Abnormalities: Decreased step clearance   Base of Support: Widened  Stance: Time  Speed/Shell: Slow  Step Length: Left shortened;Right shortened  Swing Pattern: Left asymmetrical;Right asymmetrical  Pain:  Pain Scale 1: Numeric (0 - 10)  Pain Intensity 1: 0  Pain Location 1: Shoulder  Pain Orientation 1: Left  Pain Description 1: Aching; Throbbing;Pressure  Pain Intervention(s) 1: Repositioned;Declines  Activity Tolerance:   Fair  Please refer to the flowsheet for vital signs taken during this treatment. After treatment:   [x]         Patient left in no apparent distress sitting up in chair  []         Patient left in no apparent distress in bed  [x]         Call bell left within reach  [x]         Nursing notified  []         Caregiver present  []         Bed alarm activated    COMMUNICATION/EDUCATION:   [x]         Fall prevention education was provided and the patient/caregiver indicated understanding. [x]         Patient/family have participated as able in goal setting and plan of care. [x]         Patient/family agree to work toward stated goals and plan of care. []         Patient understands intent and goals of therapy, but is neutral about his/her participation. []         Patient is unable to participate in goal setting and plan of care. Thank you for this referral.  Asaf Naylor, PT   Time Calculation: 31 mins       Mobility:  Current  CI= 1-19%   Goal  CI= 1-19%. The severity rating is based on the Other Based on functional assessment

## 2018-09-12 NOTE — ANESTHESIA PREPROCEDURE EVALUATION
Anesthetic History PONV Review of Systems / Medical History Patient summary reviewed, nursing notes reviewed and pertinent labs reviewed Pulmonary Sleep apnea: CPAP Pertinent negatives: No smoker Neuro/Psych Within defined limits Cardiovascular Hypertension: well controlled GI/Hepatic/Renal 
  
GERD: well controlled Endo/Other Diabetes: poorly controlled Morbid obesity and arthritis Other Findings Comments: etoh neg Physical Exam 
 
Airway Mallampati: IV 
TM Distance: 4 - 6 cm Neck ROM: decreased range of motion Mouth opening: Normal 
 
 Cardiovascular Dental 
 
 
  
Pulmonary Abdominal 
 
 
 
 Other Findings Anesthetic Plan ASA: 3 Anesthesia type: general and regional 
 
 
 
 
Induction: Intravenous Anesthetic plan and risks discussed with: Patient Risk of nerve injury discussed. Can be expressed by pain, numbness, weakness. May or may not recover.

## 2018-09-12 NOTE — ANESTHESIA PROCEDURE NOTES
Peripheral Block Start time: 9/12/2018 12:02 PM 
End time: 9/12/2018 12:11 PM 
Performed by: Mary Luna Authorized by: Mary Luna Pre-procedure: Indications: at surgeon's request and procedure for pain Preanesthetic Checklist: risks and benefits discussed, site marked and timeout performed Block Type:  
Block Type: Interscalene Monitoring:  Standard ASA monitoring, continuous pulse ox, frequent vital sign checks, heart rate, responsive to questions and oxygen Injection Technique:  Single shot Procedures: ultrasound guided Patient Position: seated Prep: chlorhexidine Location:  Interscalene Needle Type:  Stimuplex Needle Gauge:  22 G Needle Localization:  Anatomical landmarks and ultrasound guidance Assessment: 
 
Injection Assessment:  Incremental injection every 5 mL, local visualized surrounding nerve on ultrasound, negative aspiration for CSF, negative aspiration for blood, no intravascular symptoms and ultrasound image on chart Patient tolerance:  Patient tolerated the procedure well with no immediate complications Interscalene Nerve Block Interscalene nerve block requested by surgeon for pain control. Risks, benefits, alternatives explained and patient agrees to proceed. Time out performed and site for block and surgery identified. Standard monitors applied, 3 L NC O2, and sedation given to achieve patient comfort and anxiolysis, but maintain meaningful verbal contact: 
 
Ultrasound image to locate plexus and determine optimal needle insertion site. Sterile prep chlorhexidine followed by lidocaine local along insertion path. 5 ml of local given as superficial cervical plexus block before needle withdrawal. 
 
A 90 mm, 22G insulated Echostim needle was inserted posterior to scalene groove. Guided directly with ultrasound . Brief paresthesia with placement. Redirect. Inject small dose LA, no pain. 30 ml 0.5% ropivacaine was injected without resistance and with gentle aspiration every 3-5 ml with direct visualization with ultrasound. Entire procedure performed prior to anesthesia start time. No was noted. No complications. VSS throughout. Ultrasound Image Recorded:

## 2018-09-12 NOTE — H&P
Patient seen and examined. History and Physical Exam was reviewed.  No changes to History and Physical Exam.    Tin Campbell MD

## 2018-09-13 LAB
ANION GAP SERPL CALC-SCNC: 14 MMOL/L (ref 3–18)
BUN SERPL-MCNC: 20 MG/DL (ref 7–18)
BUN/CREAT SERPL: 16 (ref 12–20)
CALCIUM SERPL-MCNC: 8.8 MG/DL (ref 8.5–10.1)
CHLORIDE SERPL-SCNC: 107 MMOL/L (ref 100–108)
CO2 SERPL-SCNC: 23 MMOL/L (ref 21–32)
CREAT SERPL-MCNC: 1.28 MG/DL (ref 0.6–1.3)
GLUCOSE BLD STRIP.AUTO-MCNC: 175 MG/DL (ref 70–110)
GLUCOSE BLD STRIP.AUTO-MCNC: 195 MG/DL (ref 70–110)
GLUCOSE BLD STRIP.AUTO-MCNC: 238 MG/DL (ref 70–110)
GLUCOSE SERPL-MCNC: 235 MG/DL (ref 74–99)
HCT VFR BLD AUTO: 37.3 % (ref 35–45)
HGB BLD-MCNC: 12 G/DL (ref 12–16)
POTASSIUM SERPL-SCNC: 3.9 MMOL/L (ref 3.5–5.5)
SODIUM SERPL-SCNC: 144 MMOL/L (ref 136–145)

## 2018-09-13 PROCEDURE — 97535 SELF CARE MNGMENT TRAINING: CPT

## 2018-09-13 PROCEDURE — 36415 COLL VENOUS BLD VENIPUNCTURE: CPT | Performed by: PHYSICIAN ASSISTANT

## 2018-09-13 PROCEDURE — 80048 BASIC METABOLIC PNL TOTAL CA: CPT | Performed by: PHYSICIAN ASSISTANT

## 2018-09-13 PROCEDURE — 97530 THERAPEUTIC ACTIVITIES: CPT

## 2018-09-13 PROCEDURE — 65270000029 HC RM PRIVATE

## 2018-09-13 PROCEDURE — 74011250637 HC RX REV CODE- 250/637: Performed by: PHYSICIAN ASSISTANT

## 2018-09-13 PROCEDURE — 74011636637 HC RX REV CODE- 636/637: Performed by: ORTHOPAEDIC SURGERY

## 2018-09-13 PROCEDURE — 85018 HEMOGLOBIN: CPT | Performed by: PHYSICIAN ASSISTANT

## 2018-09-13 PROCEDURE — 97166 OT EVAL MOD COMPLEX 45 MIN: CPT

## 2018-09-13 PROCEDURE — 74011250636 HC RX REV CODE- 250/636: Performed by: PHYSICIAN ASSISTANT

## 2018-09-13 PROCEDURE — 97116 GAIT TRAINING THERAPY: CPT

## 2018-09-13 PROCEDURE — 82962 GLUCOSE BLOOD TEST: CPT

## 2018-09-13 RX ORDER — HYDROMORPHONE HYDROCHLORIDE 2 MG/1
TABLET ORAL
Qty: 84 TAB | Refills: 0 | Status: SHIPPED | OUTPATIENT
Start: 2018-09-13 | End: 2019-04-11

## 2018-09-13 RX ORDER — HYDROMORPHONE HYDROCHLORIDE 2 MG/1
1 TABLET ORAL
Status: DISCONTINUED | OUTPATIENT
Start: 2018-09-13 | End: 2018-09-14 | Stop reason: HOSPADM

## 2018-09-13 RX ORDER — HYDROMORPHONE HYDROCHLORIDE 2 MG/1
2 TABLET ORAL
Status: DISCONTINUED | OUTPATIENT
Start: 2018-09-13 | End: 2018-09-14 | Stop reason: HOSPADM

## 2018-09-13 RX ADMIN — CEFAZOLIN SODIUM 2 G: 2 SOLUTION INTRAVENOUS at 12:23

## 2018-09-13 RX ADMIN — HEPARIN SODIUM 5000 UNITS: 5000 INJECTION, SOLUTION INTRAVENOUS; SUBCUTANEOUS at 02:18

## 2018-09-13 RX ADMIN — METFORMIN HYDROCHLORIDE 500 MG: 500 TABLET, FILM COATED ORAL at 08:41

## 2018-09-13 RX ADMIN — ALLOPURINOL 300 MG: 300 TABLET ORAL at 08:40

## 2018-09-13 RX ADMIN — CEFAZOLIN SODIUM 2 G: 2 SOLUTION INTRAVENOUS at 02:18

## 2018-09-13 RX ADMIN — INSULIN LISPRO 3 UNITS: 100 INJECTION, SOLUTION INTRAVENOUS; SUBCUTANEOUS at 12:23

## 2018-09-13 RX ADMIN — HYDROCHLOROTHIAZIDE: 25 TABLET ORAL at 08:41

## 2018-09-13 RX ADMIN — HYDROMORPHONE HYDROCHLORIDE 2 MG: 2 TABLET ORAL at 08:39

## 2018-09-13 RX ADMIN — HYDROMORPHONE HYDROCHLORIDE 2 MG: 2 TABLET ORAL at 20:36

## 2018-09-13 RX ADMIN — GLIMEPIRIDE 4 MG: 2 TABLET ORAL at 16:23

## 2018-09-13 RX ADMIN — HEPARIN SODIUM 5000 UNITS: 5000 INJECTION, SOLUTION INTRAVENOUS; SUBCUTANEOUS at 18:47

## 2018-09-13 RX ADMIN — Medication 10 ML: at 20:37

## 2018-09-13 RX ADMIN — Medication 10 ML: at 18:56

## 2018-09-13 RX ADMIN — OXYCODONE HYDROCHLORIDE 5 MG: 5 TABLET ORAL at 04:30

## 2018-09-13 RX ADMIN — HYDROMORPHONE HYDROCHLORIDE 2 MG: 2 TABLET ORAL at 12:21

## 2018-09-13 RX ADMIN — INSULIN LISPRO 3 UNITS: 100 INJECTION, SOLUTION INTRAVENOUS; SUBCUTANEOUS at 16:39

## 2018-09-13 RX ADMIN — HYDROMORPHONE HYDROCHLORIDE 0.5 MG: 2 INJECTION, SOLUTION INTRAMUSCULAR; INTRAVENOUS; SUBCUTANEOUS at 06:18

## 2018-09-13 RX ADMIN — HEPARIN SODIUM 5000 UNITS: 5000 INJECTION, SOLUTION INTRAVENOUS; SUBCUTANEOUS at 12:25

## 2018-09-13 RX ADMIN — INSULIN LISPRO 6 UNITS: 100 INJECTION, SOLUTION INTRAVENOUS; SUBCUTANEOUS at 09:08

## 2018-09-13 RX ADMIN — HYDROMORPHONE HYDROCHLORIDE 2 MG: 2 TABLET ORAL at 16:24

## 2018-09-13 RX ADMIN — ATENOLOL 25 MG: 25 TABLET ORAL at 08:41

## 2018-09-13 RX ADMIN — METFORMIN HYDROCHLORIDE 1000 MG: 500 TABLET, FILM COATED ORAL at 16:23

## 2018-09-13 RX ADMIN — OMEPRAZOLE 20 MG: 20 CAPSULE, DELAYED RELEASE ORAL at 08:42

## 2018-09-13 RX ADMIN — GLIMEPIRIDE 4 MG: 2 TABLET ORAL at 08:40

## 2018-09-13 NOTE — PROGRESS NOTES
1938 Assumed care of pt at this time. Received report from Mabel Mcdonald RN. Pt rated pain 0/10. Pt sitting up in chair with family at bedside. 2135 Assessment complete. Pt is alert and oriented x 4. Denies SOB and chest pain. Pt lungs clear bilaterally. Capillary refill less than 3 seconds. Pt has numbness in L shoulder, but can still feel sensation and has a strong . Stated pain 0/10. Pt has 18 G IV to R hand. Pt has bandages and tape dressing. SCD's and TEDs applied to BLE. Pt encouraged to continue use of IS. Pt verbalized understanding. Ice pack applied. Call light and possessions within reach. Bed in lowest position. Will continue to monitor. 2115 Pt's blood glucose was just taken. Glucose was 300. Pt had just finished eating chocolate. Pt does not have any insulin coverage for bedtime. 0430 Pt rated pain 8/10. Administered 5mg of Percocet PRN. Shift summary: Pt is alert and oriented x 4. Pt had an uneventful shift. Pt ambulating and voiding sufficient amounts. Pain controlled by PRN medication.

## 2018-09-13 NOTE — DIABETES MGMT
NUTRITION / GLYCEMIC CONTROL PLAN OF CARE    -known h/o T2DM, morbid obesity, h/o excessive energy intake, excessive CHO intake; do believe diet & lifestyle are could provide an opportunity for improved glycemic control  Diabetes Management:    - recommend: both FBG & PPG out of target range, possibly r/t steroid injections, do believe BG will start to trend down as pt has resumed home oral regimen   - education: (see GC RN note)  - goals:    *BG will be in target range of  mg/dl (non-ICU) by 9/19   *PO intake will be 75% of meals offered by 9/19   *Pt will follow up with OP PCP for Diabetes Management by 10/30  - TDD: 9 units - Humalog Very Insulin Resistant Corrective Coverage  - BG range: 154-300 mg/dL  - Hypo: no  - BG in target range (non-ICU: <140; ICU<180): [] Yes  [x] No  - Steroids: Decadron 4 mg injection; Decadron 8 mg injection  - Intake:    Patient Vitals for the past 100 hrs:   % Diet Eaten   09/13/18 0844 100 %     Current Insulin regimen:   - Humalog Very Insulin Resistant Corrective Coverage  Metformin 500 mg with breakfast  Metformin 1000 mg with dinner  Glimepiride 4 mg BID  Home medication/insulin regimen:  Metformin 500 mg with breakfast  Metformin 1000 mg with dinner  Glimepiride 4 mg BID    Recent Glucose Results: Lab Results   Component Value Date/Time     (H) 09/13/2018 02:00 AM    GLUCPOC 238 (H) 09/13/2018 06:29 AM    GLUCPOC 300 (H) 09/12/2018 09:11 PM    GLUCPOC 154 (H) 09/12/2018 03:20 PM       Adequate glycemic control PTA:  [] Yes  [x] No    HbA1c: equivalent  to ave BGlucose of 174 mg/dl for 2-3 months prior to admission    Lab Results   Component Value Date/Time    Hemoglobin A1c 7.7 (H) 08/16/2018 10:32 AM     Diet:   Active Orders   Diet    DIET DIABETIC CONSISTENT CARB Regular       Sybil Cline MS, RN, CDE  Glycemic Control Team  [phone]  Pager [phone] (M-TH 8:30-5P)  *After Hours pager [phone]

## 2018-09-13 NOTE — PROGRESS NOTES
Problem: Falls - Risk of  Goal: *Absence of Falls  Document Ed Fall Risk and appropriate interventions in the flowsheet. Outcome: Progressing Towards Goal  Fall Risk Interventions:  Mobility Interventions: Patient to call before getting OOB, Utilize walker, cane, or other assistive device         Medication Interventions: Patient to call before getting OOB, Teach patient to arise slowly    Elimination Interventions: Call light in reach, Toileting schedule/hourly rounds    History of Falls Interventions:  Investigate reason for fall

## 2018-09-13 NOTE — DIABETES MGMT
Diabetes Patient/Family Education Record    Factors That  May Influence Patients Ability  to Learn or  Comply with Recommendations   []   Language barrier    []   Cultural needs   []   Motivation    []   Cognitive limitation    []   Physical   []   Education    []   Physiological factors   []   Hearing/vision/speaking impairment   []   Alevism beliefs    []   Financial factors   []  Other:   [x]  No factors identified at this time.      Person Instructed:   [x]   Patient   [x]   Family   []  Other     Preference for Learning:   [x]   Verbal   []   Written   []  Demonstration     Level of Comprehension & Competence:    [x]  Good                                      [] Fair                                     []  Poor                             []  Needs Reinforcement   [x]  Teachback completed    Education Component:   [x]  Medication management, including confirmation of home regimen    []  Nutritional management    []  Exercise   []  Signs, symptoms, and treatment of hyperglycemia and hypoglycemia   [] Prevention, recognition and treatment of hyperglycemia and hypoglycemia   [x]  Importance of blood glucose monitoring; SBMG daily    []  Instruction on use of the blood glucose meter   [x]  Discuss the importance of HbA1C monitoring    []  Sick day guidelines   []  Proper use and disposal of lancets, needles, syringes or insulin pens (if appropriate)   [x]  Potential short-term complications; the impact BG has on surgical site healing   [] Information about whom to contact in case of emergency or for more information    [x]  Goal:  Patient/family will demonstrate understanding of Diabetes Self Management Skills by: 10/30  Plan for post-discharge education or self-management support:    [x] Outpatient class schedule provided            [] Patient Declined    [] Scheduled for outpatient classes (date) _______     Yelena Perkins MS, RN, CDE  Glycemic Control Team  [phone]  Pager [phone] (M-TH 8:30-5P)  *After Hours pager [phone]

## 2018-09-13 NOTE — PROGRESS NOTES
Reason for Admission:   Chart reviewed and spoke with Pt who is planning on going home and will have spouse to assist. [address] offered and pt has chosen to use [facility]. Pt has no DME needs. CMS referral placed                   RRAT Score:     12                Plan for utilizing home health: Yes                       Likelihood of Readmission:  Low                         Transition of Care Plan:     Home with Corrine Marguerite. Care Management Interventions  PCP Verified by CM: Yes  Mode of Transport at Discharge:  Other (see comment)  Transition of Care Consult (CM Consult): [address]: No  Reason Outside [place]: Physician referred to specific agency ([employer] [facility])  Physical Therapy Consult: Yes  Occupational Therapy Consult: Yes  Current Support Network: Lives with Spouse  Confirm Follow Up Transport: Family  Plan discussed with Pt/Family/Caregiver: Yes  Freedom of Choice Offered: Yes  Discharge Location  Discharge Placement: Home with home health

## 2018-09-13 NOTE — ROUTINE PROCESS
Bedside and verbal shift change report given to Chang Abdi RN (oncoming nurse) by Consuelo Rubinstein, RN (offgoing nurse). Report included the following information: SBAR, Kardex, MAR, and recent results.

## 2018-09-13 NOTE — PROGRESS NOTES
Progress Note        Patient: Dory Vera MRN: [id number]  SSN: [id number]    YOB: 1940  Age: 70 y.o. Sex: female      1 Day Post-Op status post Procedure(s) (LRB):  LEFT REVERSE TOTAL SHOULDER ARTHROPLASTY. \"SPEC POP\" (N/A)    Admit Date: 2018  Admit Diagnosis: LEFT SHOULDER OSTEOARTHRITIS  H/O total shoulder replacement, left    Subjective:       Doing fair. Pain has been increasing as block wore off this morning. No SOB. No Chest Pain. No Nausea or Vomiting. No problems eating or voiding. Objective:        Temp (24hrs), Av.7 °F (36.5 °C), Min:96.8 °F (36 °C), Max:98.3 °F (36.8 °C)    Body mass index is 43.81 kg/(m^2). Patient Vitals for the past 12 hrs:   BP Temp Pulse Resp SpO2   18 0710 126/48 97.9 °F (36.6 °C) 78 15 97 %   18 0330 112/51 98 °F (36.7 °C) 80 15 95 %   18 2321 125/49 98.2 °F (36.8 °C) 84 15 94 %   18 2135 120/59 98.3 °F (36.8 °C) 84 14 94 %     Recent Labs      18   0200   HGB  12.0   HCT  37.3   NA  144   K  3.9   CL  107   CO2  23   BUN  20*   CREA  1.28   GLU  235*       Physical Exam:  Vital Signs are Stable. No Acute Distress. Alert and Oriented. Negative Homans sign bilateral Lower extremities. Neurovascular exam is normal.    Dressing is Clean, Dry, and Intact. Assessment/Plan:     1. Patient doing fair. Dressing changed to mepilex. Hemovac can be removed and then adjust sling for comfort. 2. Analgesia;  Switched ever to p.o. dilaudid  3. Non-weight bearing Operative extremity  4. OT and PT: Shoulder Pendulums, Elbow, wrist, hand and finger ROM  5. Out of BED / PT / WBAT bilateral Lower extermities  6. DVT ppx: Mobilization, WAYNE hose, and mechanical compression  7. D/c planning    Continue PT/OT  Continue ROM    Discharge Plan: Home with home health tomorrow pending pain control with oral analgesia.      Signed By: Daniel Bella PA-C     2018

## 2018-09-13 NOTE — ANESTHESIA POSTPROCEDURE EVALUATION
Post-Anesthesia Evaluation and Assessment Cardiovascular Function/Vital Signs Visit Vitals  /64 (BP 1 Location: Right arm, BP Patient Position: At rest)  Pulse 76  Temp 36.8 °C (98.2 °F)  Resp 13  Ht 5' 2\" (1.575 m)  Wt 108.6 kg (239 lb 8 oz)  SpO2 95%  BMI 43.81 kg/m2 Patient is status post Procedure(s): LEFT REVERSE TOTAL SHOULDER ARTHROPLASTY. \"SPEC POP\". Nausea/Vomiting: Controlled. Postoperative hydration reviewed and adequate. Pain: 
Pain Scale 1: Numeric (0 - 10) (09/12/18 1957) Pain Intensity 1: 0 (09/12/18 1957) Managed. Neurological Status:  
Neuro (WDL): Within Defined Limits (09/12/18 1116) At baseline. Mental Status and Level of Consciousness: Arousable. Pulmonary Status:  
O2 Device: Nasal cannula (09/12/18 1632) Adequate oxygenation and airway patent. Complications related to anesthesia: None Post-anesthesia assessment completed. No concerns. Patient has met all discharge requirements. Signed By: Teddy Smith MD  
 September 12, 2018

## 2018-09-13 NOTE — PROGRESS NOTES
Problem: Mobility Impaired (Adult and Pediatric)  Goal: *Acute Goals and Plan of Care (Insert Text)  Physical Therapy Goals   Initiated 9/12/2018 and to be accomplished within 3-5 day(s)  1. Patient will move from supine <> sit with S in prep for out of bed activity and change of position. 2.  Patient will perform sit<> stand with S with LRAD in prep for transfers/ambulation. 3.  Patient will transfer from bed <> chair with S with LRAD for time up in chair for completion of ADL activity. 4.  Patient will ambulate 150 feet with LRAD/S for improved functional mobility/safe discharge. 5.  Patient will ascend/descend 3-5 stairs with handrail with minimal assistance/contact guard assist for home re-entry as needed. Outcome: Progressing Towards Goal  physical Therapy TREATMENT    Patient: Gaurang Crum (70 y.o. female)  Date: 9/13/2018  Diagnosis: LEFT SHOULDER OSTEOARTHRITIS  H/O total shoulder replacement, left <principal problem not specified>  Procedure(s) (LRB):  LEFT REVERSE TOTAL SHOULDER ARTHROPLASTY. \"SPEC POP\" (N/A) 1 Day Post-Op  Precautions: Fall   Chart, physical therapy assessment, plan of care and goals were reviewed. PLOF: ambulatory without AD  ASSESSMENT:  Pt sitting EOB upon entering room and needing to get to the bathroom. Upon standing pt experiencing severe pain. Sling not in proper position. Pt ambulated to 12ft to bathroom. While on commode, removed and repositioned sling to proper position and pt reports comfort. Ambulated 12ft back to bed, modA with LEs to perform log roll back to supine in bed. Pillow positioned behind LUE for comfort in supine. SCDs donned and ice pack provided.       Education:educated pt, spouse and nurse on proper fit and positioning of sling with abductor pillow  Progression toward goals:  [x]      Improving appropriately and progressing toward goals  []      Improving slowly and progressing toward goals  []      Not making progress toward goals and plan of care will be adjusted     PLAN:  Patient continues to benefit from skilled intervention to address the above impairments. Continue treatment per established plan of care. Discharge Recommendations:  Home Health  Further Equipment Recommendations for Discharge:  N/A     SUBJECTIVE:   Patient stated I need help with the left side.     OBJECTIVE DATA SUMMARY:   Critical Behavior:  Neurologic State: Alert, Appropriate for age  Orientation Level: Oriented X4  Functional Mobility Training:  Bed Mobility:  Sit to Supine: Moderate assistance (with LEs)  Transfers:  Sit to Stand: Contact guard assistance  Stand to Sit: Contact guard assistance  Balance:  Sitting: Intact  Standing: Impaired  Standing - Static: Good  Standing - Dynamic : Fair  Ambulation/Gait Training:  Distance (ft): 24 Feet (ft)  Assistive Device: Gait belt  Ambulation - Level of Assistance: Contact guard assistance  Base of Support: Widened  Speed/Shell: Slow  Step Length: Right shortened;Left shortened  GCODE:Mobility  Current  CJ= 20-39%   Goal  CI= 1-19%. The severity rating is based on the Level of Assistance required for Functional Mobility and ADLs. Pain:  Pre:10  Post:2  Pain Scale 1: Numeric (0 - 10)    Pain Location 1: Shoulder  Pain Orientation 1: Anterior  Pain Description 1: Aching; Shooting; Sore  Pain Intervention(s) 1: Medication (see MAR); Elevation; Ice  Activity Tolerance:   Fair(-) due to pain  Please refer to the flowsheet for vital signs taken during this treatment.   After treatment:   [] Patient left in no apparent distress sitting up in chair  [x] Patient left in no apparent distress in bed  [x] Call bell left within reach  [x] Nursing notified  [] Caregiver present  [] Bed alarm activated      Kim Martinez PTA   Time Calculation: 32 mins

## 2018-09-13 NOTE — PROGRESS NOTES
Problem: Self Care Deficits Care Plan (Adult)  Goal: *Acute Goals and Plan of Care (Insert Text)  Occupational Therapy Goals  Initiated 9/13/2018 within 7 day(s). 1.  Patient will perform upper body dressing with minimal assistance/contact guard assist   2. Patient will perform lower body dressing with minimal assistance/contact guard assist.  3.  Patient will perform toilet transfers with supervision/set-up. 4.  Patient will perform all aspects of toileting with supervision/set-up. 5.  Patient will participate in pendulum and UE exercises with supervision/set-up for 5 minutes to increase I with HEP. Occupational Therapy EVALUATION    Patient: Dory Vera (70 y.o. female)  Date: 9/13/2018  Primary Diagnosis: LEFT SHOULDER OSTEOARTHRITIS  H/O total shoulder replacement, left  Procedure(s) (LRB):  LEFT REVERSE TOTAL SHOULDER ARTHROPLASTY. \"SPEC POP\" (N/A) 1 Day Post-Op   Precautions:  Fall, NWB, WBAT    ASSESSMENT :  Based on the objective data described below, the patient presents with Left reverse total shoulder and limited by pain at this time. Pt completed UB dressing with max assist and LB dressing with mod/max assist. Pt education on ADL technique and home safety. Pt completed pendulum exercises in room and AAROM elbow flexion. Functional mobility in room with CGA. Pt could benefit from OT to increase I with ADLs, transfers, mobility, activity tolerance and strength for functional activity. Patient will benefit from skilled intervention to address the above impairments.   Patients rehabilitation potential is considered to be Good  Factors which may influence rehabilitation potential include:   []             None noted  []             Mental ability/status  []             Medical condition  []             Home/family situation and support systems  []             Safety awareness  []             Pain tolerance/management  []             Other:      PLAN :  Recommendations and Planned Interventions:  [x]               Self Care Training                  [x]        Therapeutic Activities  [x]               Functional Mobility Training    []        Cognitive Retraining  [x]               Therapeutic Exercises           [x]        Endurance Activities  [x]               Balance Training                   []        Neuromuscular Re-Education  []               Visual/Perceptual Training     [x]   Home Safety Training  [x]               Patient Education                 [x]        Family Training/Education  []               Other (comment):    Frequency/Duration: Patient will be followed by occupational therapy 1-2 times per day/4-7 days per week to address goals. Discharge Recommendations: Home Health  Further Equipment Recommendations for Discharge: N/A     SUBJECTIVE:   Patient stated It hurts.     OBJECTIVE DATA SUMMARY:     Past Medical History:   Diagnosis Date    Arthritis     Arthropathy     Atypical glandular cells of undetermined significance (MELLY) on cervical Pap smear     Back pain     Chronic kidney disease     stones    Complex ovarian cyst     Diabetes mellitus ([facility] [address] 2008    Endometrial cancer ([facility] [address]     Endometrial hyperplasia     GERD (gastroesophageal reflux disease)     H/O cervical biopsy 10/26/2010    H/O knee surgery     H/O shoulder surgery     History of hysteroscopy 10/26/2010    History of staph infection     Hx of cholecystectomy     Hx of tonsillectomy     Hypertension 1988    Incisional hernia, without obstruction or gangrene     Kidney stones 03/2017    Mononeuropathy     Morbid obesity with BMI of 40.0-44.9, adult ([address]     Other screening mammogram     Ovarian cyst     PONV (postoperative nausea and vomiting)     Post-menopausal bleeding     Postmenopausal bleeding     Sleep apnea     instructed to bring CPAP day of surgery    Urinary incontinence      Past Surgical History:   Procedure Laterality Date    BIOPSY/EXCIS CERVICAL LESN      COLONOSCOPY,DIAGNOSTIC      HX APPENDECTOMY      HX CATARACT REMOVAL Bilateral     HX CHOLECYSTECTOMY      HX HYSTERECTOMY      HX HYSTEROSCOPY      HX KNEE REPLACEMENT Bilateral     partial    HX ORTHOPAEDIC Right     carpal tunnel    HX ROTATOR CUFF REPAIR Right     HX SALPINGO-OOPHORECTOMY Bilateral     HX TONSILLECTOMY      HX TONSILLECTOMY      HX WISDOM TEETH EXTRACTION      WA ANESTH,KNEE AREA SURGERY      [place] ANESTH,SURGERY OF SHOULDER      REMOVAL OF KIDNEY STONE       Barriers to Learning/Limitations: None  Compensate with: visual, verbal, tactile, kinesthetic cues/model  Prior Level of Function/Home Situation: I with ADLs prior to admission  Home Situation  Home Environment: Patient room  # Steps to Enter: 3  Rails to Enter: Yes  Hand Rails : Left (Pt reports she ascends the steps backwards)  One/Two Story Residence: Two story  Living Alone: No  Support Systems: Family member(s)  Patient Expects to be Discharged to[de-identified] Private residence  Current DME Used/Available at Home: Carollee Cashing, straight, Walker, rollator  Tub or Shower Type: Shower  []  Right hand dominant   []  Left hand dominant  Cognitive/Behavioral Status:  Neurologic State: Alert  Orientation Level: Oriented X4     Safety/Judgement: Awareness of environment; Fall prevention  Skin: incision on right UE covered with dressing  Edema: min edema noted on right UE  Vision/Perceptual:  N/A  Coordination:  Fine Motor Skills-Upper: Left Impaired;Right Intact    Gross Motor Skills-Upper: Left Impaired;Right Intact  Balance:  Sitting: Intact  Standing: Impaired  Standing - Static: Good  Standing - Dynamic : Fair  Strength:  Strength: Grossly decreased, non-functional   N/A on left UE   Right UE WFL  Tone & Sensation:  Sensation: Impaired  Range of Motion:  AROM: Grossly decreased, non-functional N/A left UE  Right UE WFL  Functional Mobility and Transfers for ADLs:  Bed Mobility:  Sit to Supine: Minimum assistance  Scooting: Minimum assistance  Transfers:  Sit to Stand: Contact guard assistance   Toilet Transfer : Contact guard assistance   ADL Assessment:  Upper Body Dressing: Maximum assistance  Lower Body Dressing: Maximum assistance  Toileting: Minimum assistance  ADL Intervention:  Cognitive Retraining  Safety/Judgement: Awareness of environment; Fall prevention    Pain:  Pain Scale 1: Numeric (0 - 10)  Pain Intensity 1: 8  Pain Location 1: Shoulder  Pain Orientation 1: Anterior  Pain Description 1: Aching; Shooting  Pain Intervention(s) 1: Medication (see MAR)  Activity Tolerance:   fair  Please refer to the flowsheet for vital signs taken during this treatment. After treatment:   [] Patient left in no apparent distress sitting up in chair  [x] Patient left in no apparent distress in bed  [x] Call bell left within reach  [] Nursing notified  [x] Caregiver present  [] Bed alarm activated    COMMUNICATION/EDUCATION:   [x] Home safety education was provided and the patient/caregiver indicated understanding. [x] Patient/family have participated as able in goal setting and plan of care. [x] Patient/family agree to work toward stated goals and plan of care. [] Patient understands intent and goals of therapy, but is neutral about his/her participation. [] Patient is unable to participate in goal setting and plan of care. Thank you for this referral.  Bernardo Mccormack, OTR/L  Time Calculation: 34 mins     Carry  Current  CL= 60-79%    Goal  CI= 1-19%. The severity rating is based on the Level of Assistance required for Functional Mobility and ADLs.

## 2018-09-13 NOTE — PROGRESS NOTES
09/12/18 6478   Assessment  Type   Assessment Type Admission assessment   Reassessment Performed Changes documented below   Activity and Safety   Activity Level Up with Assistance   Activity In bed;Family/Visitors present   Activity Assistance Partial (one person)   Weight Bearing Status WBAT (Weight Bearing as Tolerated)   Mode of Transportation Wheelchair   Repositioned Head of bed elevated (degrees)   Patient Turned Turns self   Head of Bed Elevated Self regulated   Assistive Device Fall prevention device;Gait belt;Walker (comment)   Safety Measures Bed/Chair-Wheels locked; Bed in low position;Call light within reach;Gripper socks; Side rails X2   Psychosocial   Psychosocial (WDL) WDL   Purposeful Interaction Yes   Pt Identified Daily Priority Clinical issues (comment)   Caring Interventions Reassure; Therapeutic modalities   Reassure Therapeutic listening;Caring rounds   Therapeutic Modalities Humor; Intentional therapeutic touch   Caritas Process Nurture loving kindness; Teaching/learning   Neuro   Neurologic State Alert; Appropriate for age   LUE Motor Response Pharmacologically paralyzed;Numbness;Weak   LLE Motor Response Purposeful   RUE Motor Response Purposeful   RLE Motor Response Purposeful   Orientation Level Oriented X4   RASS    Mcgowan Agitation Sedation Scale (RASS) 0   Target RASS 0   EENT   EENT (WDL) WDL   Visual Impairment None   Visual Aid Glasses; With patient   Respiratory   Respiratory (WDL) WDL   Breath Sounds Bilateral Clear   Incentive Spirometry Treatment   Level of Service Assisted by nursing   Actual Volume (ml) 500 ml   Cardiac   Cardiac (WDL) X   B/P Outside of Defined Limits Yes   Cardiac/Telemetry   Cardiac/Telemetry Monitor On No   VTE Prophylaxis (Shift Required Documentation)   Mechanical VTE Orders Yes   Graduated Compression Stockings Bilateral   Sequential Compression Device Bilateral   Peripheral Vascular   Peripheral Vascular (WDL) X   LUE Peripheral Vascular   Capillary Refill Less than/equal to 3 seconds   Color Appropriate for race   Temperature Warm   Sensation Present   Radial Pulse Palpable;Present   Edema   LLE 3+;Pitting   RLE 3+;Pitting   Abdominal    Abdominal (WDL) WDL   Last Bowel Movement Date 09/11/18   Genitourinary   Genitourinary (WDL) X   Urine Assessment   Urinary Status Has not voided   Musculoskeletal   LUE Surgery;Limited movement;Numbness;Weakness   LLE Swelling   RLE Swelling   Dual Skin Pressure Injury Assessment   Dual Skin Pressure Injury Assessment WDL   Second Care Provider (Based on Facility Policy) Ludin Carson RN   Skin Integumentary   Skin Integumentary (WDL) X   Skin Color Appropriate for ethnicity   Skin Condition/Temp Warm   Skin Integrity Incision (comment)  (L shoulder)   Turgor Non-tenting   Hair Growth Present   Varicosities Absent   Wound Prevention and Protection Methods   Orientation of Wound Prevention Posterior   Location of Wound Prevention Buttocks   Dressing Present  No   Wound Offloading (Prevention Methods) Bed, pressure redistribution/air;Pillows   Neuro   Neuro (WDL) WDL   Musculoskeletal   Musculoskeletal (WDL) X       1648 - Patient arrives to unit at this time. Dual skin assessment completed with Nati Mancini RN. Admission completed at this time. Patient is A/O x 4. IV to RH intact and patent. SCD and TEDS applied BLE. Foam tape/4x4 dressing to L shoulder CDI. Pt reports numbness to L arm/hand/fingers. Positive pulses palpable. Pain 0/10. Patient was oriented to the room to include use of call bell, meal ordering, and use of incentive spirometer patient able to get up to 500 on IS. Patient was given explanation of \" up for dinner\" program and has verbalized understanding. Bed in lowest position. Phone and call bell left within reach. Patient educated on need to call for assistance before getting OOB. Plan of care for the day addressed with patient. Educated on pain medication availability and possible side effects.  Will continue to monitor. O2 sats dropping w/ PT. 2L NC placed back on pt. VS and O2 continue to be monitor. Pt informed to continue using IS 10xhr. Bedside and Verbal shift change report given to [address] [place] [address] by Dionne Will RN. Report included the following information SBAR, Kardex, OR Summary, Intake/Output and MAR.

## 2018-09-13 NOTE — ROUTINE PROCESS
Bedside and Verbal shift change report given to Jackie Tian RN (oncoming nurse) by Maureen Hillman RN (offgoing nurse). Report included the following information SBAR, Kardex, Intake/Output and MAR.

## 2018-09-13 NOTE — PROGRESS NOTES
7200-  Patient in bed at this time. A/O x 4. IV to right hand  intact and patent. TEDS and SCDs to bilateral legs. Mepilex dressing to left shoulder CDI. No numbness/tingling. Radial pulses palpable. Lungs CTA. Bowel sounds active to all quadrants. Pain 8/10, Dilaudid given. 3+ pitting edema noted. 920-Pain decreased to 5/10.    1221-Dilaudid 2 mg given for c/o pain rated 8/10.    1318-Pain decreased to 5/10.    1330-Hemovac removed with catheter tip intact, no complaints, mepilex applied. 1624-Dilaudid 2 mg given for c/o pain rated 5/10.    1710-Pain decreased to 3/10.    1639-Ambulated to bathroom with one minimal assist and use of walker, 500cc out. Shift summary-pain better controlled with change of pain medication to Dilaudid. Ambulates to bathroom with one assist and use of walker. Voiding without difficulty.

## 2018-09-14 VITALS
DIASTOLIC BLOOD PRESSURE: 65 MMHG | SYSTOLIC BLOOD PRESSURE: 142 MMHG | HEIGHT: 62 IN | WEIGHT: 239.5 LBS | RESPIRATION RATE: 16 BRPM | BODY MASS INDEX: 44.07 KG/M2 | TEMPERATURE: 98 F | OXYGEN SATURATION: 94 % | HEART RATE: 65 BPM

## 2018-09-14 LAB
GLUCOSE BLD STRIP.AUTO-MCNC: 125 MG/DL (ref 70–110)
GLUCOSE BLD STRIP.AUTO-MCNC: 132 MG/DL (ref 70–110)

## 2018-09-14 PROCEDURE — 97110 THERAPEUTIC EXERCISES: CPT

## 2018-09-14 PROCEDURE — 74011250637 HC RX REV CODE- 250/637: Performed by: PHYSICIAN ASSISTANT

## 2018-09-14 PROCEDURE — 74011250636 HC RX REV CODE- 250/636: Performed by: PHYSICIAN ASSISTANT

## 2018-09-14 PROCEDURE — 97530 THERAPEUTIC ACTIVITIES: CPT

## 2018-09-14 PROCEDURE — 74011250637 HC RX REV CODE- 250/637: Performed by: ORTHOPAEDIC SURGERY

## 2018-09-14 PROCEDURE — 97116 GAIT TRAINING THERAPY: CPT

## 2018-09-14 PROCEDURE — 82962 GLUCOSE BLOOD TEST: CPT

## 2018-09-14 RX ORDER — ACETAMINOPHEN 500 MG
1000 TABLET ORAL EVERY 8 HOURS
Status: DISCONTINUED | OUTPATIENT
Start: 2018-09-14 | End: 2018-09-14 | Stop reason: HOSPADM

## 2018-09-14 RX ORDER — PROCHLORPERAZINE EDISYLATE 5 MG/ML
10 INJECTION INTRAMUSCULAR; INTRAVENOUS
Status: DISCONTINUED | OUTPATIENT
Start: 2018-09-14 | End: 2018-09-14

## 2018-09-14 RX ADMIN — OMEPRAZOLE 20 MG: 20 CAPSULE, DELAYED RELEASE ORAL at 08:24

## 2018-09-14 RX ADMIN — HYDROMORPHONE HYDROCHLORIDE 2 MG: 2 TABLET ORAL at 08:25

## 2018-09-14 RX ADMIN — HYDROCHLOROTHIAZIDE: 25 TABLET ORAL at 08:24

## 2018-09-14 RX ADMIN — ATENOLOL 25 MG: 25 TABLET ORAL at 08:24

## 2018-09-14 RX ADMIN — HYDROMORPHONE HYDROCHLORIDE 2 MG: 2 TABLET ORAL at 12:37

## 2018-09-14 RX ADMIN — HYDROMORPHONE HYDROCHLORIDE 2 MG: 2 TABLET ORAL at 04:16

## 2018-09-14 RX ADMIN — ACETAMINOPHEN 1000 MG: 500 TABLET, FILM COATED ORAL at 10:20

## 2018-09-14 RX ADMIN — GLIMEPIRIDE 4 MG: 2 TABLET ORAL at 08:23

## 2018-09-14 RX ADMIN — METFORMIN HYDROCHLORIDE 500 MG: 500 TABLET, FILM COATED ORAL at 08:24

## 2018-09-14 RX ADMIN — HEPARIN SODIUM 5000 UNITS: 5000 INJECTION, SOLUTION INTRAVENOUS; SUBCUTANEOUS at 04:13

## 2018-09-14 RX ADMIN — HEPARIN SODIUM 5000 UNITS: 5000 INJECTION, SOLUTION INTRAVENOUS; SUBCUTANEOUS at 10:21

## 2018-09-14 RX ADMIN — HYDROMORPHONE HYDROCHLORIDE 2 MG: 2 TABLET ORAL at 00:26

## 2018-09-14 RX ADMIN — ALLOPURINOL 300 MG: 300 TABLET ORAL at 08:24

## 2018-09-14 NOTE — DISCHARGE INSTRUCTIONS
[facility]    Patient Discharge Instructions    Gaurang Crum / [id number] : 1940    Admitted 2018 Discharged: 2018     IF YOU HAVE ANY PROBLEMS ONCE YOU ARE AT HOME CALL THE FOLLOWING NUMBERS:   Main office number: [phone]    Your follow up appointment to see Dr. Lisbet Fonseca is scheduled in 1-2 weeks. If you are unsure of your appointment date call the office at [phone]. Take Home Medications     · Resume your home medictions as directed  · A prescription for pain medication has been given   · It is important that you take the medication exactly as they are prescribed. · Keep your medication in the bottles provided by the pharmacist and keep a list of the medication names, dosages, and times to be taken in your wallet. · Do not take other medications without consulting your doctor. · Note:  If you have already received and/or filled a prescription for one or more of the medications you've received a prescription for when leaving the hospital, you may disregard the duplicate prescription. What to do at [address] [place] [address] Ave your prehospital diet. If you have excessive nausea or vomitting call your doctor's office     Perform Shoulder Pendulum exercises several times daily. Wear the sling most of the time. Take your arm out of the sling to move your elbow, wrist and fingers. Do not  anything with your arm heavier than a pencil. Keep incision DRY. You may need to sponge bathe to avoid getting the incision wet. When to Call    - Call if you have a temperature greater then 101  - Unable to keep food down  - Are unable to bear any wieght   - Need a pain medication refill     Information obtained by :  I understand that if any problems occur once I am at home I am to contact my physician. I understand and acknowledge receipt of the instructions indicated above. Physician's or R.N.'s Signature                                                                  Date/Time                                                                                                                                              Patient or Representative Signature                                                          Date/Time

## 2018-09-14 NOTE — ROUTINE PROCESS
Bedside and Verbal shift change report given to NORMA Kirk (oncoming nurse) by Onur Marsh RN (offgoing nurse). Report included the following information SBAR, Kardex and MAR.

## 2018-09-14 NOTE — PROGRESS NOTES
Problem: Falls - Risk of  Goal: *Absence of Falls  Document Ed Fall Risk and appropriate interventions in the flowsheet.    Outcome: Progressing Towards Goal  Fall Risk Interventions:  Mobility Interventions: Assess mobility with egress test, OT consult for ADLs, PT Consult for assist device competence, Utilize walker, cane, or other assistive device, Patient to call before getting OOB         Medication Interventions: Patient to call before getting OOB, Teach patient to arise slowly    Elimination Interventions: Call light in reach, Toileting schedule/hourly rounds, Patient to call for help with toileting needs, Toilet paper/wipes in reach    History of Falls Interventions: Consult care management for discharge planning, Investigate reason for fall

## 2018-09-14 NOTE — PROGRESS NOTES
Problem: Mobility Impaired (Adult and Pediatric)  Goal: *Acute Goals and Plan of Care (Insert Text)  Physical Therapy Goals   Initiated 9/12/2018 and to be accomplished within 3-5 day(s)  1. Patient will move from supine <> sit with S in prep for out of bed activity and change of position. 2.  Patient will perform sit<> stand with S with LRAD in prep for transfers/ambulation. 3.  Patient will transfer from bed <> chair with S with LRAD for time up in chair for completion of ADL activity. 4.  Patient will ambulate 150 feet with LRAD/S for improved functional mobility/safe discharge. 5.  Patient will ascend/descend 3-5 stairs with handrail with minimal assistance/contact guard assist for home re-entry as needed. Outcome: Resolved/Met Date Met: 09/14/18  physical Therapy TREATMENT/DISCHARGE    Patient: Cristina Darby (70 y.o. female)  Date: 9/14/2018  Diagnosis: LEFT SHOULDER OSTEOARTHRITIS  H/O total shoulder replacement, left <principal problem not specified>  Procedure(s) (LRB):  LEFT REVERSE TOTAL SHOULDER ARTHROPLASTY. \"SPEC POP\" (N/A) 2 Days Post-Op  Precautions: Fall, NWB, WBAT  Chart, physical therapy assessment, plan of care and goals were reviewed. ASSESSMENT:  Pt sitting in chair upon entering room, daughter present. Ambulated 160ft without AD, holding wall railing occassionally. No LOB or path deviations. Negotiated 5 steps holding R hand rail and progressing laterally. Doffed sling and performed Codman's exercise in all directions. Assisted pt and daughter with upper body dressing and properly donning sling. Log roll performed to return to supine in bed.   EDUCATION HEP 3x/day, ice, dressing/bathing, ambulate hourly as tolerated, stair nego tech, log roll tech, proper fit and use of sling per Dr protocol  Progression toward goals:  [x]      Goals met  []      Improving appropriately and progressing toward goals  []      Improving slowly and progressing toward goals  []      Not making progress toward goals and plan of care will be adjusted     PLAN:  Patient will be discharged from physical therapy at this time. Rationale for discharge:  [x] Goals Achieved  [] [address]  [] Patient not participating in therapy  [] Other:  Discharge Recommendations:  Home Health  Further Equipment Recommendations for Discharge:  N/A     SUBJECTIVE:   Patient stated Can you show my daughter the dangling exercise.     OBJECTIVE DATA SUMMARY:     G CODES: Mobility  [id number] Goal  CI= 1-19%  D/C  CI= 1-19%. The severity rating is based on the Level of Assistance required for Functional Mobility and ADLs. Critical Behavior:  Neurologic State: Alert, Appropriate for age  Orientation Level: Oriented X4  Safety/Judgement: Awareness of environment, Fall prevention  Functional Mobility Training:  Bed Mobility:  Sit to Supine: Contact guard assistance  Scooting: Minimum assistance  Transfers:  Sit to Stand: Supervision  Stand to Sit: Supervision  Balance:  Sitting: Intact  Standing: Intact  Standing - Static: Good  Standing - Dynamic : Good (-)  Ambulation/Gait Training:  Distance (ft): 160 Feet (ft)  Assistive Device: Gait belt  Ambulation - Level of Assistance: Stand-by assistance;Supervision  Speed/Shell: Slow  Stairs:  Number of Stairs Trained: 5  Stairs - Level of Assistance: Supervision   Rail Use: Right   Therapeutic Exercises:   Codman's  Pain:  Pre treatment pain:  3  Post treatment pain:  3  Pain Scale 1: Numeric (0 - 10)    Pain Location 1: Shoulder  Pain Orientation 1: Left  Pain Description 1: Aching    Activity Tolerance:   Good  Please refer to the flowsheet for vital signs taken during this treatment.   After treatment:   [] Patient left in no apparent distress sitting up in chair  [x] Patient left in no apparent distress in bed  [x] Call bell left within reach  [x] Nursing notified  [x] Caregiver present  [] Bed alarm activated  Edna Hwang PTA   Time Calculation: 47 mins

## 2018-09-14 NOTE — PROGRESS NOTES
0715 Assumed care of pt at this time. Pt in bed, sleeping  with no signs of distress. Pt left with call light within reach and encouraged to call for assistance. 0825 Head to toe assessment completed at this time  Patient is A&OX4. Pt denies N/V chest pain and SOB or distress. Pt is calm and cooperative. Radial  Pulse present. Capillary refill less than 3 seconds. Skin in warm and dry with mepilex dressing on Lt shoulder and is CDI and Sling in place. Lungs are clear bilaterally. Patient instructed on use and reason for incentive spirometer. Bowel sounds are active. Abdomen is soft and non-tender. WAYNE & SCDS in place bilaterally . Positive dorsalis pedis pulse, sensation, warm. No tingling or numbness to lower extremities. Pain scale explained to patient. Reasons for taking PRN meds explained to patient. Patient instructed to call for prn when needed. Pain level is 3 , medicated as per MAR. Patient was oriented to call bell and bed function. Will continue to monitor    1237 Dual AVS reviewed with Enrique CLAIRE Rn. Pain 3/10, medicated as per MAR. All medications reviewed individually with patient. Opportunities for questions and concerns provided. Patient discharged via car . Patient's arm band appropriately discarded.

## 2018-09-14 NOTE — PROGRESS NOTES
1945: Assessment completed and documented. Patient alert and oriented x 4, incision to LEFT SHOULDER. [address] dressing clean, dry and intact. Pain 4/10 to LEFT SHOULDER on a 0-10/10 numeric scale. Ice packs to site. Patient denies numbness or tingling to bilateral lower and upper extremities. No nausea, no SOB, no distress. Patient educated on IS, and \"up for dinner program\", patient verbalized understanding. Patient sitting on edge of bed. Daughter at bedside. 2037: patient assisted to the bathroom, then medicated for pain per MAR. Daughter staying with patient over night. 2140: CPAP applied. 0018: Patient medicated for pain 4/10 to left shoulder per [facility]     0416: Patient assisted out of bed to the bathroom, CHG wipes done. Medicated for pain per MAR. Patient up to chair. 0463: Patient back to bed. CPAP applied. 0630: patient resting in bed, . Daughter at bedside.

## 2018-09-14 NOTE — DISCHARGE SUMMARY
DISCHARGE SUMMARY     PATIENT: Haresh Jay     MRN: [id number]   ADMIT DATE: 2018   [facility]LL[place]   DISCHARGE DATE:  18     ATTENDING: Ambar Wood MD   DICTATING: Darnell Eastman PA-C     ADMISSION DIAGNOSIS: LEFT SHOULDER OSTEOARTHRITIS  H/O total shoulder replacement, left    DISCHARGE DIAGNOSIS: Status post LEFT SHOULDER OSTEOARTHRITIS    HISTORY OF PRESENT ILLNESS: The patient is a 70y.o. year-old female   with ongoing Left shoulder pain secondary to rotator cuff arthropathy and osteoarthritis of Left shoulder. The patient's pain has persisted and progressed despite conservative treatments and therapies. The patient has at this time opted for surgical intervention.     PAST MEDICAL HISTORY:   Past Medical History:   Diagnosis Date    Arthritis     Arthropathy     Atypical glandular cells of undetermined significance (MELLY) on cervical Pap smear     Back pain     Chronic kidney disease     stones    Complex ovarian cyst     Diabetes mellitus ([address]     Endometrial cancer ([address]     Endometrial hyperplasia     GERD (gastroesophageal reflux disease)     H/O cervical biopsy 10/26/2010    H/O knee surgery     H/O shoulder surgery     History of hysteroscopy 10/26/2010    History of staph infection     Hx of cholecystectomy     Hx of tonsillectomy     Hypertension     Incisional hernia, without obstruction or gangrene     Kidney stones 2017    Mononeuropathy     Morbid obesity with BMI of 40.0-44.9, adult ([address]     Other screening mammogram     Ovarian cyst     PONV (postoperative nausea and vomiting)     Post-menopausal bleeding     Postmenopausal bleeding     Sleep apnea     instructed to bring CPAP day of surgery    Urinary incontinence        PAST SURGICAL HISTORY:   Past Surgical History:   Procedure Laterality Date    BIOPSY/EXCIS CERVICAL LESN      COLONOSCOPY,DIAGNOSTIC      HX APPENDECTOMY      HX CATARACT REMOVAL Bilateral     HX CHOLECYSTECTOMY      HX HYSTERECTOMY      HX HYSTEROSCOPY      HX KNEE REPLACEMENT Bilateral     partial    HX ORTHOPAEDIC Right     carpal tunnel    HX ROTATOR CUFF REPAIR Right     HX SALPINGO-OOPHORECTOMY Bilateral     HX TONSILLECTOMY      HX TONSILLECTOMY      HX WISDOM TEETH EXTRACTION      RI ANESTH,KNEE AREA SURGERY      RI ANESTH,SURGERY OF SHOULDER      REMOVAL OF KIDNEY STONE         ALLERGIES:   Allergies   Allergen Reactions    Sulfa (Sulfonamide Antibiotics) Rash        CURRENT MEDICATIONS:  A list of medications prior to the time of admission include:  Prior to Admission medications    Medication Sig Start Date End Date Taking? Authorizing Provider   docusate sodium (COLACE) 50 mg capsule Take 2 Caps by mouth three (3) times daily as needed for Constipation for up to 90 days. 9/13/18 12/12/18 Yes Vaishali Rodriguez PA-C   HYDROmorphone (DILAUDID) 2 mg tablet 1-2 tabs by mouth every 4-6 hours as needed for pain 9/13/18  Yes Dorethia Jennifer, PA-C   omega 3-DHA-EPA-fish oil 1,000 mg (120 mg-180 mg) capsule Take 1 Cap by mouth daily. Yes Historical Provider   allopurinol (ZYLOPRIM) 100 mg tablet 300 mg daily. 4/24/17  Yes Historical Provider   glimepiride (AMARYL) 2 mg tablet Take 4 mg by mouth two (2) times a day. 11/22/15  Yes Historical Provider   VOLTAREN 1 % topical gel  3/16/15  Yes Historical Provider   omeprazole (PRILOSEC) 20 mg capsule  6/15/15  Yes Historical Provider   atenolol (TENORMIN) 25 mg tablet Take 25 mg by mouth daily. Yes Historical Provider   ketoconazole (NIZORAL) 2 % shampoo Apply  to affected area daily as needed for Itching. Yes Historical Provider   amLODIPine (NORVASC) 5 mg tablet Take 5 mg by mouth nightly as needed. Historical Provider   indomethacin (INDOCIN) 25 mg capsule Take 25 mg by mouth three (3) times daily as needed. Historical Provider   metFORMIN (GLUCOPHAGE) 500 mg tablet Take 500 mg by mouth daily (with breakfast).     Historical Provider   metFORMIN (GLUCOPHAGE) 1,000 mg tablet Take 1,000 mg by mouth daily (with dinner). Historical Provider   mupirocin calcium (BACTROBAN) 2 % topical cream Apply  to affected area two (2) times a day. Historical Provider   calcium-cholecalciferol, D3, (CALTRATE 600+D) tablet Take 1 Tab by mouth two (2) times a day. Historical Provider   polyvinyl alcohol-povidon,PF, (REFRESH CLASSIC) 1.4-0.6 % ophthalmic solution Administer 1-2 Drops to both eyes as needed. Historical Provider   aspirin delayed-release 81 mg tablet Take  by mouth as needed for Pain. Historical Provider   PNV62-FA-om3-dha-epa-fish oil (PRENATAL GUMMY) 400 mcg-35 mg -25 mg-5 mg chew Take  by mouth. Historical Provider   losartan-hydrochlorothiazide (HYZAAR) 100-25 mg per tablet Take 1 Tab by mouth daily. 6/15/15   Historical Provider   potassium chloride SR (KLOR-CON 10) 10 mEq tablet Take 20 mEq by mouth three (3) times daily. Historical Provider       FAMILY HISTORY:   Family History   Problem Relation Age of Onset    Hypertension Mother     Heart Disease Mother     Hypertension Father     Stroke Father        SOCIAL HISTORY:   Social History     Social History    Marital status:      Spouse name: N/A    Number of children: N/A    Years of education: N/A     Social History Main Topics    Smoking status: Never Smoker    Smokeless tobacco: Never Used    Alcohol use No    Drug use: No    Sexual activity: No     Other Topics Concern    None     Social History Narrative       REVIEW OF SYSTEMS: All review of systems are negative. PHYSICAL EXAMINATION: For a detailed physical exam, please refer to the patient's chart. HOSPITAL COURSE: The patient was taken to surgery the day of admission. she underwent Left reverse total shoulder replacement. Operative course was benign. Estimated blood loss approximately 100 mL.  The patient was taken to the PACU in stable condition and was later taken to the floor in stable condition. During her hospital stay, the patient progressed well with physical therapy and occupational therapy, adherent to instructions. she was placed on mechanical sequential compression devices as well as an early mobilization protocol for DVT prophylaxis. her pain has been well controlled with oral pain medications. her vitals have remained stable. she has also remained hemodynamically stable. DISCHARGE INSTRUCTIONS: The patient is to be transferred to home with home health. she is to continue on her prior medications per the medication reconciliation form, to which we will add:         1) Dilaudid 2mg; 1-2 tabs by mouth every 4-6 hours as needed for pain         2) Colace 100 mg tab by mouth 3x daily as needed for constipation           The patient is to continue with physical therapy to work on pendulum exercises of her Left shoulder as well as active and passive range of motion exercises of the elbow, wrist, hand and fingers on the  Left. The patient is to ambulate as tolerable and maximize time out of bed during the day. The patient is to continue to keep her incision dry. The patient is to followup with Dr. Leanne Nguyễn in the office approximately 10-14 days status post for x-rays and further evaluation.     Landon Goldberg PA-C  9/16/24856:51 AM

## 2018-11-27 ENCOUNTER — OFFICE VISIT (OUTPATIENT)
Dept: ONCOLOGY | Age: 78
End: 2018-11-27

## 2018-11-27 VITALS
HEART RATE: 71 BPM | RESPIRATION RATE: 20 BRPM | HEIGHT: 62 IN | BODY MASS INDEX: 44.9 KG/M2 | OXYGEN SATURATION: 95 % | SYSTOLIC BLOOD PRESSURE: 148 MMHG | TEMPERATURE: 96.3 F | WEIGHT: 244 LBS | DIASTOLIC BLOOD PRESSURE: 74 MMHG

## 2018-11-27 DIAGNOSIS — C54.1 ENDOMETRIAL CANCER (HCC): Primary | ICD-10-CM

## 2018-11-27 RX ORDER — RIVAROXABAN 20 MG/1
TABLET, FILM COATED ORAL
Refills: 0 | COMMUNITY
Start: 2018-10-30 | End: 2019-04-11

## 2018-11-27 NOTE — PROGRESS NOTES
[facility]  [address] [place] [address], [address], [address] [id number] [phone]  Lisa Davila DO      Patient ID:  Name: Beena Lin  MRM: [id number]  : 1940/78 y.o. Date: 2018    SUBJECTIVE:    This is a 70 y.o.   female with a diagnosis of Stage IAG1 Endometrial Cancer following a robotic assisted TLH and BSO with cystoscopy 7/2/15. Pt is here today for a 6 month f/u. Patient denies any Gyn symptoms. Patient specifically denies any abnormal vaginal bleeding, vaginal discharge, or vaginal irritation. Patient also denies abdominal pain, pelvic pain, or abdominal bloating. Patient is voiding without difficulty and bowel movements are regular. Pt had surgery for shoulder and then diagnosed with blood clot. Her pathology revealed: 7/2/15      Imaging:  CT 11/25/15  Impression:      1. There is a fat containing umbilical hernia measuring approximately 3.9 cm in diameter. This has a neck measuring 2.6 cm in diameter. There is no protrusion of bowel into this hernia. No other abdominal wall hernia is evident. 2. Fatty infiltration of the liver. 3. Status post cholecystectomy. 4. A nonobstructing stone is noted in the lower pole of the left renal collecting system. 5. Colonic diverticulosis without evidence of acute diverticulitis. 6. Status post hysterectomy and bilateral oophorectomies. 7. The appendix is not visualized and is also presumed surgically absent. Medications:     Current Outpatient Medications on File Prior to Visit   Medication Sig Dispense Refill    XARELTO 20 mg tab tablet take 1 tablet by mouth every evening TAKE WITH EVENING MEAL  0    amLODIPine (NORVASC) 5 mg tablet Take 5 mg by mouth nightly as needed.  indomethacin (INDOCIN) 25 mg capsule Take 25 mg by mouth three (3) times daily as needed.       calcium-cholecalciferol, D3, (CALTRATE 600+D) tablet Take 1 Tab by mouth two (2) times a day.  polyvinyl alcohol-povidon,PF, (REFRESH CLASSIC) 1.4-0.6 % ophthalmic solution Administer 1-2 Drops to both eyes as needed.  omega 3-DHA-EPA-fish oil 1,000 mg (120 mg-180 mg) capsule Take 1 Cap by mouth daily.  glimepiride (AMARYL) 2 mg tablet Take 4 mg by mouth two (2) times a day.  0    PNV62-FA-om3-dha-epa-fish oil (PRENATAL GUMMY) 400 mcg-35 mg -25 mg-5 mg chew Take  by mouth.  losartan-hydrochlorothiazide (HYZAAR) 100-25 mg per tablet Take 1 Tab by mouth daily. 0    omeprazole (PRILOSEC) 20 mg capsule   0    atenolol (TENORMIN) 25 mg tablet Take 25 mg by mouth daily.  potassium chloride SR (KLOR-CON 10) 10 mEq tablet Take 20 mEq by mouth three (3) times daily.  docusate sodium (COLACE) 50 mg capsule Take 2 Caps by mouth three (3) times daily as needed for Constipation for up to 90 days. 120 Cap 1    HYDROmorphone (DILAUDID) 2 mg tablet 1-2 tabs by mouth every 4-6 hours as needed for pain 84 Tab 0    metFORMIN (GLUCOPHAGE) 500 mg tablet Take 500 mg by mouth daily (with breakfast).  metFORMIN (GLUCOPHAGE) 1,000 mg tablet Take 1,000 mg by mouth daily (with dinner).  mupirocin calcium (BACTROBAN) 2 % topical cream Apply  to affected area two (2) times a day.  aspirin delayed-release 81 mg tablet Take  by mouth as needed for Pain.  allopurinol (ZYLOPRIM) 100 mg tablet 300 mg daily. 0    VOLTAREN 1 % topical gel   0    ketoconazole (NIZORAL) 2 % shampoo Apply  to affected area daily as needed for Itching. No current facility-administered medications on file prior to visit. Allergies:      Allergies   Allergen Reactions    Sulfa (Sulfonamide Antibiotics) Rash       OBJECTIVE:    Vitals:   Visit Vitals  /74 (BP 1 Location: Right arm, BP Patient Position: Sitting)   Pulse 71   Temp 96.3 °F (35.7 °C) (Oral)   Resp 20   Ht 5' 2\" (1.575 m)   Wt 110.7 kg (244 lb)   LMP  (LMP Unknown)   SpO2 95%   BMI 44.63 kg/m²       Physical Examination:    General:  alert, cooperative, no distress   Abdomen: soft, bowel sounds active, non-tender   Incision: healing well, small bulge above umblicus   Pelvic: NEFG, Spec exam revealed vaginal cuff intact, BME revealed vaginal cuff intact, nontender   Rectal: not done   Extremity:   extremities normal, atraumatic, no cyanosis or edema     IMPRESSION/PLAN:  1. Stage IAG1 endometrial cancer, who is clinically NEREYDA   -reviewed signs/symptoms of recurrence   -reviewed surveillance strategy   -pelvic exam performed today   -will plan for f/u in  6 months   -all of patients questions/concerns addressed    2. Urinary Incontinence   -improved; following with urology              Total time spent was 25 minutes regarding diagnosis of endometrial cancer and >50% of this time was spent counseling and coordinating care.     Ethel Morel DO  Gynecologic Oncology  11/27/2018/10:03 AM

## 2018-11-27 NOTE — PATIENT INSTRUCTIONS
Below you will find information on the condition you were previously diagnosed with. Please call the office as soon as possible if you begin to experience the following symptoms: Persistent or worsening abdominal or pelvic pain, any unusual vaginal bleeding, discharge, odor, or irritation, and any changes in bladder or bowel habits such as constipation, diarrhea, burning, or general discomfort. Please call the office if you have any other questions or concerns. Uterine Cancer: Care Instructions  Your Care Instructions  Uterine cancer is the rapid growth of abnormal cells that line the uterus. It also is called endometrial cancer. These cells may spread to nearby organs, lymph glands, or distant organs. Uterine cancer can be cured most often when found early. Treatment may include surgery to remove the uterus, ovaries, fallopian tubes, and sometimes the pelvic lymph nodes. Radiation and hormones to stop cancer growth also are sometimes used. Chemotherapy may be used if the cancer has spread. Having cancer can be scary. You may feel many emotions and may need some help coping. Seek out family, friends, and counselors for support. You can do things at home to make yourself feel better while you go through treatment. You also can call the [employer] (4-325.688.1935) or visit its website at [address] [employer] for more information. Follow-up care is a key part of your treatment and safety. Be sure to make and go to all appointments, and call your doctor if you are having problems. It's also a good idea to know your test results and keep a list of the medicines you take. How can you care for yourself at home? · Take your medicines exactly as prescribed. Call your doctor if you think you are having a problem with your medicine. You may get medicine for nausea and vomiting if you have these side effects. · Eat healthy food.  If you do not feel like eating, try to eat food that has protein and extra calories to keep up your strength and prevent weight loss. Drink liquid meal replacements for extra calories and protein. Try to eat your main meal early. · Get some physical activity every day, but do not get too tired. Keep doing the hobbies you enjoy as your energy allows. · Take steps to control your stress and workload. Learn relaxation techniques. ? Share your feelings. Stress and tension affect our emotions. By expressing your feelings to others, you may be able to understand and cope with them. ? Consider joining a support group. Talking about a problem with your spouse, a good friend, or other people with similar problems is a good way to reduce tension and stress. ? Express yourself through art. Try writing, dance, art, or crafts to relieve tension. Some dance, writing, or art groups may be available just for people who have cancer. ? Be kind to your body and mind. Getting enough sleep, eating a healthy diet, and taking time to do things you enjoy can contribute to an overall feeling of balance in your life and help reduce stress. ? Get help if you need it. Discuss your concerns with your doctor or counselor. · If you are vomiting or have diarrhea:  ? Drink plenty of fluids (enough so that your urine is light yellow or clear like water) to prevent dehydration. Choose water and other caffeine-free clear liquids. If you have kidney, heart, or liver disease and have to limit fluids, talk with your doctor before you increase the amount of fluids you drink. ? When you are able to eat, try clear soups, mild foods, and liquids until all symptoms are gone for 12 to 48 hours. Other good choices include dry toast, crackers, cooked cereal, and gelatin dessert, such as Jell-O.  · Take care of your urinary tract to prevent problems such as infection, which can be caused by uterine cancer and its treatment. Limit drinks with caffeine, drink plenty of fluids, and urinate every 3 to 4 hours.   · If you have not already done so, prepare a list of advance directives. Advance directives are instructions to your doctor and family members about what kind of care you want if you become unable to speak or express yourself. When should you call for help? Call 911 anytime you think you may need emergency care. For example, call if:    · You passed out (lost consciousness).    Call your doctor now or seek immediate medical care if:    · You have a fever.     · You have abnormal bleeding.     · You have new or worse pain.     · You think you have an infection.     · You have new symptoms, such as a cough, belly pain, vomiting, diarrhea, or a rash.    Watch closely for changes in your health, and be sure to contact your doctor if:    · You are much more tired than usual.     · You have swollen glands in your armpits, groin, or neck.     · You do not get better as expected. Where can you learn more? Go to http://laura-krystina.info/. Enter E343 in the search box to learn more about \"Uterine Cancer: Care Instructions. \"  Current as of: March 28, 2018  Content Version: 11.8  © 7871-8412 [employer]. Care instructions adapted under license by [employer] (which disclaims liability or warranty for this information). If you have questions about a medical condition or this instruction, always ask your healthcare professional. [address] any warranty or liability for your use of this information.

## 2018-11-27 NOTE — PROGRESS NOTES
Serena Abdi, a 70 y.o. female,  is here for   Chief Complaint   Patient presents with    Uterine Cancer     6 month surveillance       Visit Vitals  /74 (BP 1 Location: Right arm, BP Patient Position: Sitting)   Pulse 71   Temp 96.3 °F (35.7 °C) (Oral)   Resp 20   Ht 5' 2\" (1.575 m)   Wt 110.7 kg (244 lb)   SpO2 95%   BMI 44.63 kg/m²     Patient denies any persistent or worsening abdominal or pelvic pain. Denies any unusual vaginal bleeding, discharge, irritation, or odor. Denies experiencing any constipation or diarrhea. No burning, discomfort, or irritation with urination. 1. Have you been to the ER, urgent care clinic since your last visit? Hospitalized since your last visit? No    2. Have you seen or consulted any other health care providers outside of the [address] [place] [address] since your last visit? Include any pap smears or colon screening.  Yes When: 2018 Where: [facility] Reason for visit: reverse shoulder replacement

## 2019-04-11 ENCOUNTER — HOSPITAL ENCOUNTER (OUTPATIENT)
Dept: PREADMISSION TESTING | Age: 79
Discharge: HOME | End: 2019-04-11
Payer: MEDICARE

## 2019-04-11 VITALS — HEIGHT: 62 IN | WEIGHT: 252 LBS | BODY MASS INDEX: 46.38 KG/M2

## 2019-04-11 LAB
ANION GAP SERPL CALC-SCNC: 11 MMOL/L (ref 3–18)
ATRIAL RATE: 62 BPM
BUN SERPL-MCNC: 19 MG/DL (ref 7–18)
BUN/CREAT SERPL: 18 (ref 12–20)
CALCIUM SERPL-MCNC: 10.7 MG/DL (ref 8.5–10.1)
CALCULATED P AXIS, ECG09: 39 DEGREES
CALCULATED R AXIS, ECG10: -18 DEGREES
CALCULATED T AXIS, ECG11: 17 DEGREES
CHLORIDE SERPL-SCNC: 105 MMOL/L (ref 100–108)
CO2 SERPL-SCNC: 26 MMOL/L (ref 21–32)
CREAT SERPL-MCNC: 1.03 MG/DL (ref 0.6–1.3)
DIAGNOSIS, 93000: NORMAL
EST. AVERAGE GLUCOSE BLD GHB EST-MCNC: 197 MG/DL
GLUCOSE SERPL-MCNC: 138 MG/DL (ref 74–99)
HBA1C MFR BLD: 8.5 % (ref 4.2–5.6)
HCT VFR BLD AUTO: 38.7 % (ref 35–45)
HGB BLD-MCNC: 11.9 G/DL (ref 12–16)
P-R INTERVAL, ECG05: 182 MS
POTASSIUM SERPL-SCNC: 4 MMOL/L (ref 3.5–5.5)
Q-T INTERVAL, ECG07: 410 MS
QRS DURATION, ECG06: 94 MS
QTC CALCULATION (BEZET), ECG08: 416 MS
SODIUM SERPL-SCNC: 142 MMOL/L (ref 136–145)
VENTRICULAR RATE, ECG03: 62 BPM

## 2019-04-11 PROCEDURE — 85018 HEMOGLOBIN: CPT

## 2019-04-11 PROCEDURE — 80048 BASIC METABOLIC PNL TOTAL CA: CPT

## 2019-04-11 PROCEDURE — 83036 HEMOGLOBIN GLYCOSYLATED A1C: CPT

## 2019-04-11 PROCEDURE — 93005 ELECTROCARDIOGRAM TRACING: CPT

## 2019-04-11 RX ORDER — FUROSEMIDE 40 MG/1
40 TABLET ORAL AS NEEDED
COMMUNITY

## 2019-04-11 RX ORDER — BISMUTH SUBSALICYLATE 262 MG
1 TABLET,CHEWABLE ORAL DAILY
COMMUNITY

## 2019-04-11 RX ORDER — SODIUM CHLORIDE, SODIUM LACTATE, POTASSIUM CHLORIDE, CALCIUM CHLORIDE 600; 310; 30; 20 MG/100ML; MG/100ML; MG/100ML; MG/100ML
125 INJECTION, SOLUTION INTRAVENOUS CONTINUOUS
Status: CANCELLED | OUTPATIENT
Start: 2019-05-01

## 2019-04-11 NOTE — PERIOP NOTES
Patient arrived for her PAT appt. Pre-admit testing completed. MARCUS prep reviewed and given. Patient has sleep apnea. She was instructed to bring CPAP DOS. Patient meets criteria for spec pop. Sheet scanned into media. Patient instructed not to have anything by mouth after midnight- night before sx. Also instructed patient not to bring valuables and not to wear jewelry, make- up or nail polish- DOS. PAT appt completed.

## 2019-05-01 ENCOUNTER — ANESTHESIA EVENT (OUTPATIENT)
Dept: SURGERY | Age: 79
End: 2019-05-01
Payer: MEDICARE

## 2019-05-01 ENCOUNTER — HOSPITAL ENCOUNTER (OUTPATIENT)
Age: 79
Setting detail: OUTPATIENT SURGERY
Discharge: HOME | End: 2019-05-01
Attending: ORTHOPAEDIC SURGERY | Admitting: ORTHOPAEDIC SURGERY
Payer: MEDICARE

## 2019-05-01 ENCOUNTER — ANESTHESIA (OUTPATIENT)
Dept: SURGERY | Age: 79
End: 2019-05-01
Payer: MEDICARE

## 2019-05-01 VITALS
HEART RATE: 65 BPM | HEIGHT: 62 IN | BODY MASS INDEX: 46.57 KG/M2 | SYSTOLIC BLOOD PRESSURE: 122 MMHG | OXYGEN SATURATION: 96 % | DIASTOLIC BLOOD PRESSURE: 52 MMHG | RESPIRATION RATE: 16 BRPM | WEIGHT: 253.06 LBS | TEMPERATURE: 97.1 F

## 2019-05-01 DIAGNOSIS — Z98.890 S/P LEFT KNEE ARTHROSCOPY: Primary | ICD-10-CM

## 2019-05-01 LAB
GLUCOSE BLD STRIP.AUTO-MCNC: 213 MG/DL (ref 70–110)
GLUCOSE BLD STRIP.AUTO-MCNC: 227 MG/DL (ref 70–110)

## 2019-05-01 PROCEDURE — 77030018835 HC SOL IRR LR ICUM -A: Performed by: ORTHOPAEDIC SURGERY

## 2019-05-01 PROCEDURE — 77030018725 HC ELECTRD 90DEG DISP J&J -D: Performed by: ORTHOPAEDIC SURGERY

## 2019-05-01 PROCEDURE — 82962 GLUCOSE BLOOD TEST: CPT

## 2019-05-01 PROCEDURE — 77030022877 HC TU IRR ARTHRO PMP ARTH -B: Performed by: ORTHOPAEDIC SURGERY

## 2019-05-01 PROCEDURE — 76010000149 HC OR TIME 1 TO 1.5 HR: Performed by: ORTHOPAEDIC SURGERY

## 2019-05-01 PROCEDURE — L1830 KO IMMOB CANVAS LONG PRE OTS: HCPCS | Performed by: ORTHOPAEDIC SURGERY

## 2019-05-01 PROCEDURE — 74011250636 HC RX REV CODE- 250/636: Performed by: ORTHOPAEDIC SURGERY

## 2019-05-01 PROCEDURE — 76210000006 HC OR PH I REC 0.5 TO 1 HR: Performed by: ORTHOPAEDIC SURGERY

## 2019-05-01 PROCEDURE — 77030040361 HC SLV COMPR DVT MDII -B: Performed by: ORTHOPAEDIC SURGERY

## 2019-05-01 PROCEDURE — 77030012824 HC WRP CLD THER DJOR -B: Performed by: ORTHOPAEDIC SURGERY

## 2019-05-01 PROCEDURE — 77030020782 HC GWN BAIR PAWS FLX 3M -B: Performed by: ORTHOPAEDIC SURGERY

## 2019-05-01 PROCEDURE — 77030006884 HC BLD SHV INCIS S&N -B: Performed by: ORTHOPAEDIC SURGERY

## 2019-05-01 PROCEDURE — 74011000250 HC RX REV CODE- 250: Performed by: ORTHOPAEDIC SURGERY

## 2019-05-01 PROCEDURE — 74011636637 HC RX REV CODE- 636/637: Performed by: ANESTHESIOLOGY

## 2019-05-01 PROCEDURE — 77030002916 HC SUT ETHLN J&J -A: Performed by: ORTHOPAEDIC SURGERY

## 2019-05-01 PROCEDURE — 74011250636 HC RX REV CODE- 250/636

## 2019-05-01 PROCEDURE — 74011000250 HC RX REV CODE- 250

## 2019-05-01 PROCEDURE — 76210000021 HC REC RM PH II 0.5 TO 1 HR: Performed by: ORTHOPAEDIC SURGERY

## 2019-05-01 PROCEDURE — 77030034478 HC TU IRR ARTHRO PT ARTH -B: Performed by: ORTHOPAEDIC SURGERY

## 2019-05-01 PROCEDURE — 76060000033 HC ANESTHESIA 1 TO 1.5 HR: Performed by: ORTHOPAEDIC SURGERY

## 2019-05-01 RX ORDER — CEFAZOLIN SODIUM 2 G/50ML
2 SOLUTION INTRAVENOUS
Status: COMPLETED | OUTPATIENT
Start: 2019-05-01 | End: 2019-05-01

## 2019-05-01 RX ORDER — ONDANSETRON 2 MG/ML
4 INJECTION INTRAMUSCULAR; INTRAVENOUS ONCE
Status: CANCELLED | OUTPATIENT
Start: 2019-05-01 | End: 2019-05-01

## 2019-05-01 RX ORDER — BUPIVACAINE HYDROCHLORIDE 2.5 MG/ML
INJECTION, SOLUTION EPIDURAL; INFILTRATION; INTRACAUDAL AS NEEDED
Status: DISCONTINUED | OUTPATIENT
Start: 2019-05-01 | End: 2019-05-01 | Stop reason: HOSPADM

## 2019-05-01 RX ORDER — INSULIN LISPRO 100 [IU]/ML
INJECTION, SOLUTION INTRAVENOUS; SUBCUTANEOUS ONCE
Status: COMPLETED | OUTPATIENT
Start: 2019-05-01 | End: 2019-05-01

## 2019-05-01 RX ORDER — DEXTROSE 50 % IN WATER (D50W) INTRAVENOUS SYRINGE
25-50 AS NEEDED
Status: CANCELLED | OUTPATIENT
Start: 2019-05-01

## 2019-05-01 RX ORDER — EPINEPHRINE 1 MG/ML
INJECTION, SOLUTION, CONCENTRATE INTRAVENOUS AS NEEDED
Status: DISCONTINUED | OUTPATIENT
Start: 2019-05-01 | End: 2019-05-01 | Stop reason: HOSPADM

## 2019-05-01 RX ORDER — HYDROCODONE BITARTRATE AND ACETAMINOPHEN 5; 325 MG/1; MG/1
1 TABLET ORAL
Qty: 12 TAB | Refills: 0 | Status: SHIPPED | OUTPATIENT
Start: 2019-05-01 | End: 2019-05-06

## 2019-05-01 RX ORDER — LIDOCAINE HYDROCHLORIDE 20 MG/ML
INJECTION, SOLUTION EPIDURAL; INFILTRATION; INTRACAUDAL; PERINEURAL AS NEEDED
Status: DISCONTINUED | OUTPATIENT
Start: 2019-05-01 | End: 2019-05-01 | Stop reason: HOSPADM

## 2019-05-01 RX ORDER — MIDAZOLAM HYDROCHLORIDE 1 MG/ML
INJECTION, SOLUTION INTRAMUSCULAR; INTRAVENOUS AS NEEDED
Status: DISCONTINUED | OUTPATIENT
Start: 2019-05-01 | End: 2019-05-01 | Stop reason: HOSPADM

## 2019-05-01 RX ORDER — MAGNESIUM SULFATE 100 %
4 CRYSTALS MISCELLANEOUS AS NEEDED
Status: CANCELLED | OUTPATIENT
Start: 2019-05-01

## 2019-05-01 RX ORDER — INSULIN LISPRO 100 [IU]/ML
INJECTION, SOLUTION INTRAVENOUS; SUBCUTANEOUS
Status: DISCONTINUED | OUTPATIENT
Start: 2019-05-01 | End: 2019-05-01

## 2019-05-01 RX ORDER — ONDANSETRON 2 MG/ML
INJECTION INTRAMUSCULAR; INTRAVENOUS AS NEEDED
Status: DISCONTINUED | OUTPATIENT
Start: 2019-05-01 | End: 2019-05-01 | Stop reason: HOSPADM

## 2019-05-01 RX ORDER — SODIUM CHLORIDE, SODIUM LACTATE, POTASSIUM CHLORIDE, CALCIUM CHLORIDE 600; 310; 30; 20 MG/100ML; MG/100ML; MG/100ML; MG/100ML
125 INJECTION, SOLUTION INTRAVENOUS CONTINUOUS
Status: DISCONTINUED | OUTPATIENT
Start: 2019-05-01 | End: 2019-05-01 | Stop reason: HOSPADM

## 2019-05-01 RX ORDER — EPHEDRINE SULFATE/0.9% NACL/PF 25 MG/5 ML
SYRINGE (ML) INTRAVENOUS AS NEEDED
Status: DISCONTINUED | OUTPATIENT
Start: 2019-05-01 | End: 2019-05-01 | Stop reason: HOSPADM

## 2019-05-01 RX ORDER — SODIUM CHLORIDE, SODIUM LACTATE, POTASSIUM CHLORIDE, CALCIUM CHLORIDE 600; 310; 30; 20 MG/100ML; MG/100ML; MG/100ML; MG/100ML
50 INJECTION, SOLUTION INTRAVENOUS CONTINUOUS
Status: CANCELLED | OUTPATIENT
Start: 2019-05-01

## 2019-05-01 RX ORDER — INSULIN LISPRO 100 [IU]/ML
INJECTION, SOLUTION INTRAVENOUS; SUBCUTANEOUS ONCE
Status: CANCELLED | OUTPATIENT
Start: 2019-05-01 | End: 2019-05-01

## 2019-05-01 RX ORDER — FENTANYL CITRATE 50 UG/ML
INJECTION, SOLUTION INTRAMUSCULAR; INTRAVENOUS AS NEEDED
Status: DISCONTINUED | OUTPATIENT
Start: 2019-05-01 | End: 2019-05-01 | Stop reason: HOSPADM

## 2019-05-01 RX ORDER — PROPOFOL 10 MG/ML
INJECTION, EMULSION INTRAVENOUS AS NEEDED
Status: DISCONTINUED | OUTPATIENT
Start: 2019-05-01 | End: 2019-05-01 | Stop reason: HOSPADM

## 2019-05-01 RX ORDER — NALOXONE HYDROCHLORIDE 0.4 MG/ML
0.1 INJECTION, SOLUTION INTRAMUSCULAR; INTRAVENOUS; SUBCUTANEOUS
Status: CANCELLED | OUTPATIENT
Start: 2019-05-01

## 2019-05-01 RX ORDER — OXYCODONE AND ACETAMINOPHEN 5; 325 MG/1; MG/1
1 TABLET ORAL AS NEEDED
Status: CANCELLED | OUTPATIENT
Start: 2019-05-01

## 2019-05-01 RX ORDER — METOCLOPRAMIDE HYDROCHLORIDE 5 MG/ML
INJECTION INTRAMUSCULAR; INTRAVENOUS AS NEEDED
Status: DISCONTINUED | OUTPATIENT
Start: 2019-05-01 | End: 2019-05-01 | Stop reason: HOSPADM

## 2019-05-01 RX ORDER — HYDROMORPHONE HYDROCHLORIDE 2 MG/ML
0.5 INJECTION, SOLUTION INTRAMUSCULAR; INTRAVENOUS; SUBCUTANEOUS
Status: CANCELLED | OUTPATIENT
Start: 2019-05-01

## 2019-05-01 RX ORDER — FENTANYL CITRATE 50 UG/ML
25 INJECTION, SOLUTION INTRAMUSCULAR; INTRAVENOUS
Status: CANCELLED | OUTPATIENT
Start: 2019-05-01

## 2019-05-01 RX ORDER — LIDOCAINE HYDROCHLORIDE AND EPINEPHRINE 10; 10 MG/ML; UG/ML
INJECTION, SOLUTION INFILTRATION; PERINEURAL AS NEEDED
Status: DISCONTINUED | OUTPATIENT
Start: 2019-05-01 | End: 2019-05-01 | Stop reason: HOSPADM

## 2019-05-01 RX ADMIN — SODIUM CHLORIDE, SODIUM LACTATE, POTASSIUM CHLORIDE, AND CALCIUM CHLORIDE 125 ML/HR: 600; 310; 30; 20 INJECTION, SOLUTION INTRAVENOUS at 06:50

## 2019-05-01 RX ADMIN — LIDOCAINE HYDROCHLORIDE 40 MG: 20 INJECTION, SOLUTION EPIDURAL; INFILTRATION; INTRACAUDAL; PERINEURAL at 07:36

## 2019-05-01 RX ADMIN — METOCLOPRAMIDE HYDROCHLORIDE 10 MG: 5 INJECTION INTRAMUSCULAR; INTRAVENOUS at 07:47

## 2019-05-01 RX ADMIN — Medication 10 MG: at 07:59

## 2019-05-01 RX ADMIN — INSULIN LISPRO 4 UNITS: 100 INJECTION, SOLUTION INTRAVENOUS; SUBCUTANEOUS at 07:06

## 2019-05-01 RX ADMIN — FENTANYL CITRATE 50 MCG: 50 INJECTION, SOLUTION INTRAMUSCULAR; INTRAVENOUS at 08:34

## 2019-05-01 RX ADMIN — Medication 10 MG: at 07:57

## 2019-05-01 RX ADMIN — Medication 10 MG: at 07:44

## 2019-05-01 RX ADMIN — PROPOFOL 200 MG: 10 INJECTION, EMULSION INTRAVENOUS at 07:36

## 2019-05-01 RX ADMIN — FENTANYL CITRATE 50 MCG: 50 INJECTION, SOLUTION INTRAMUSCULAR; INTRAVENOUS at 07:42

## 2019-05-01 RX ADMIN — FENTANYL CITRATE 50 MCG: 50 INJECTION, SOLUTION INTRAMUSCULAR; INTRAVENOUS at 07:48

## 2019-05-01 RX ADMIN — FENTANYL CITRATE 50 MCG: 50 INJECTION, SOLUTION INTRAMUSCULAR; INTRAVENOUS at 08:29

## 2019-05-01 RX ADMIN — ONDANSETRON 4 MG: 2 INJECTION INTRAMUSCULAR; INTRAVENOUS at 07:43

## 2019-05-01 RX ADMIN — Medication 10 MG: at 07:49

## 2019-05-01 RX ADMIN — MIDAZOLAM HYDROCHLORIDE 2 MG: 1 INJECTION, SOLUTION INTRAMUSCULAR; INTRAVENOUS at 07:28

## 2019-05-01 RX ADMIN — CEFAZOLIN SODIUM 2 G: 2 SOLUTION INTRAVENOUS at 07:41

## 2019-05-01 NOTE — INTERVAL H&P NOTE
H&P Update: Mariposa Simons was seen and examined. History and physical has been reviewed. The patient has been examined.  There have been no significant clinical changes since the completion of the originally dated History and Physical.

## 2019-05-01 NOTE — ANESTHESIA POSTPROCEDURE EVALUATION
Post-Anesthesia Evaluation and Assessment Cardiovascular Function/Vital Signs Visit Vitals /90 Pulse 76 Temp 37.1 °C (98.8 °F) Resp 18 Ht 5' 2\" (1.575 m) Wt 114.8 kg (253 lb 1 oz) SpO2 95% BMI 46.29 kg/m² Patient is status post Procedure(s): LEFT KNEE ARTHROSCOPY,  LOOSE BODY REMOVAL , SYNOVECTOMY \"SPEC POP\". Nausea/Vomiting: Controlled. Postoperative hydration reviewed and adequate. Pain: 
Pain Scale 1: Numeric (0 - 10) (05/01/19 0906) Pain Intensity 1: 0 (05/01/19 0906) Managed. Neurological Status:  
Neuro (WDL): Within Defined Limits (05/01/19 0657) At baseline. Mental Status and Level of Consciousness: Baseline and stable. Pulmonary Status:  
O2 Device: Room air (05/01/19 0909) Adequate oxygenation and airway patent. Complications related to anesthesia: None Post-anesthesia assessment completed. No concerns. Patient has met all discharge requirements.  
 
Signed By: Liz Stallings MD

## 2019-05-01 NOTE — PERIOP NOTES
Reviewed PTA medication list with patient/caregiver and patient/caregiver denies any additional medications. Patient admits to having a responsible adult care for them at home for at least 24 hours after surgery. Patient denies gena chewing/swallowing difficulties. Patient encouraged to use Lio paws warming system and informed that using Lio paws to regulate body temperature prior to a procedure has been shown to help reduce the risks of blood clots and infection. Dual skin assessment & fall risk band verification completed with Batsheva Farooq RN.

## 2019-05-01 NOTE — OP NOTES
[address] [place] [address], [facility], [address] [place] [address], [address] ARTHROSCOPY    Patient: Denia Shrestha MRN: [id number]  SSN: [id number]    YOB: 1940  Age: 70 y.o. Sex: female      Date of Surgery: 5/1/2019   Preoperative Diagnosis: LEFT KNEE LOOSE BODY   Postoperative Diagnosis: LEFT KNEE LOOSE BODY   Location: [facility]  Surgeon: Denise Ruiz MD  Assistant: Circ-1: Raul Miller RN  Scrub Tech-1: Eduardo Serrano  Float Staff: Edel Gonzalez    Anesthesia: Regional + Local    Procedure: Left Knee Arthroscopy, chondroplasty patellofemoral joint; synovectomy; removal of loose bodies    Removal of Loose Body (CPT 10559)  Major Synovectomy (CPT 88223)  Chondroplasty (CPT 31538)    Findings:  Bone fragments adherent to medial capsule at joint line; Loose body; synovitis; 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    ---------  INDICATIONS:   This 70y.o.-year-old female has had pain in the left knee for over 6 months. The xrays show bone fragments medial to the medial unicompartmental knee arthorplasty component and arthritic changes worst of the patellofemoral compartment. A left knee arthroscopy has been recommended. The risks and the possible complications of this surgery and anesthesia including, but not exclusive to, bleeding, infection, blood clots, nerve and vascular injury, possible need for other procedures, and possibly death have been explained to the patient.   The patient accepts these risks for the benefits of surgery over the failure of non-operative care to provide adequate pain relief and improve their functional disability. Due to the progressive arthritis of the patellofemoral joint, the patient is a candidate for conversion to TKA, however she is an obese uncontrolled diabetic with history of DVT and chronic Lower extremity Edema which places her at high risk with Revision to TKA. Since the majority of her symptoms are localized to the medial joint line we are optimistic that a more conservative approach with arthroscopy will mitigate the majority of her symptoms. The patients medical problems have been addressed by anesthesia and is deemed stable and as well controlled as possible. DESCRIPTION OF PROCEDURE:    After the risks and benefits of surgery were discussed, informed consent was obtained. The patient was identified in the preoperative holding area and the operative extremity was marked. The patient was taken to the operating room and placed in the supine position. General anesthesia was performed. IV antibiotics were given. After verification of the appropriate operative site from the consent form, with confirmation of surgeon, anesthesia and nursing teams, a tourniquet was placed and the left leg was prepped and draped in sterile fashion using Chloraprep. A formal timeout was performed. After esmark exsanguination, the tourniquet was inflated. 1% lidocaine with epinephrine was injected into the anterolateral and anteromedial portal site areas. The anterolateral portal was made. First the trocar followed by the arthroscope was inserted through this portal.  The knee was filled with saline. Under direct visualization the anteromedial portal was made. A Probe was inserted through the medial portal.      Diagnostic arthroscopy was performed. Synovitis was present.   Cartilage examination revealed Grade 4 changes of the patella, Grade 4 changes of the trochlea, Medial unicompartmental mobile bearing knee arthroplasty noted in the medial compartment, Grade 1-2 changes of the lateral femoral condyle, and Grade 1-2 changes of the lateral tibial plateau. The ACL was attenuated, but stable to probing. The lateral meniscus was probed and intact. A brendan fragment was adherent to the medial capsule at the joint line - this fragment had significant soft tissue attachments medially. Using arthroscopic julius and cautery, the medial brendan fragment was carefully removed from the medial capsule taking care not to damage the underlying MCL. This bone measured about 10 mm A-P and 5 mm medial-lateral and 6 mm Sup-Inferior. A separate loose body measuring about 8 mm x 8 mm x 6 mm was also removed through the shaver. A chondroplasty was performed of patellofemoral joint. A complete synovectomy was performed. The fluid was drained from the knee. The portals were closed with a 3-0 Nylon sutures. 30 cc of 0.25% marcaine were injected into the suprapatellar pouch and sterile dressings were placed. The patient was awakened and there were no complications. Final sponge and needle counts were correct x2.     Fred Palacio MD

## 2019-05-01 NOTE — DISCHARGE INSTRUCTIONS
DISCHARGE SUMMARY from Nurse    PATIENT INSTRUCTIONS:    After general anesthesia or intravenous sedation, for 24 hours or while taking prescription Narcotics:  · Limit your activities  · Do not drive and operate hazardous machinery  · Do not make important personal or business decisions  · Do  not drink alcoholic beverages  · If you have not urinated within 8 hours after discharge, please contact your surgeon on call. Report the following to your surgeon:  · Excessive pain, swelling, redness or odor of or around the surgical area  · Temperature over 100.5  · Nausea and vomiting lasting longer than 4 hours or if unable to take medications  · Any signs of decreased circulation or nerve impairment to extremity: change in color, persistent  numbness, tingling, coldness or increase pain  · Any questions    What to do at Home:  [address] 2 WEEKS CALL FOR APPT  PLEASE [address]    If you experience any of the following symptoms heavy bleeding, fevers, severe pain, circulation changes, please follow up with dr huggins    *  Please give a list of your current medications to your Primary Care Provider. *  Please update this list whenever your medications are discontinued, doses are      changed, or new medications (including over-the-counter products) are added. *  Please carry medication information at all times in case of emergency situations. These are general instructions for a healthy lifestyle:    No smoking/ No tobacco products/ Avoid exposure to second hand smoke  Surgeon General's Warning:  Quitting smoking now greatly reduces serious risk to your health.     Obesity, smoking, and sedentary lifestyle greatly increases your risk for illness    A healthy diet, regular physical exercise & weight monitoring are important for maintaining a healthy lifestyle    You may be retaining fluid if you have a history of heart failure or if you experience any of the following symptoms:  Weight gain of 3 pounds or more overnight or 5 pounds in a week, increased swelling in our hands or feet or shortness of breath while lying flat in bed. Please call your doctor as soon as you notice any of these symptoms; do not wait until your next office visit. Recognize signs and symptoms of STROKE:    F-face looks uneven    A-arms unable to move or move unevenly    S-speech slurred or non-existent    T-time-call 911 as soon as signs and symptoms begin-DO NOT go       Back to bed or wait to see if you get better-TIME IS BRAIN. Warning Signs of HEART ATTACK     Call 911 if you have these symptoms:   Chest discomfort. Most heart attacks involve discomfort in the center of the chest that lasts more than a few minutes, or that goes away and comes back. It can feel like uncomfortable pressure, squeezing, fullness, or pain.  Discomfort in other areas of the upper body. Symptoms can include pain or discomfort in one or both arms, the back, neck, jaw, or stomach.  Shortness of breath with or without chest discomfort.  Other signs may include breaking out in a cold sweat, nausea, or lightheadedness. Don't wait more than five minutes to call 911 - MINUTES MATTER! Fast action can save your life. Calling 911 is almost always the fastest way to get lifesaving treatment. Emergency Medical Services staff can begin treatment when they arrive -- up to an hour sooner than if someone gets to the hospital by car. The discharge information has been reviewed with the patient and caregiver. The patient and caregiver verbalized understanding. Discharge medications reviewed with the patient and caregiver and appropriate educational materials and side effects teaching were provided.   ___________________________________________________________________________________________________________________________________    Patient armband removed and shredded

## 2019-05-01 NOTE — PERIOP NOTES
TRANSFER - OUT REPORT:    Verbal report given to Alfred Gramajo RN  (name) on Blake Gaona  being transferred to phase 2(unit) for routine post - op       Report consisted of patients Situation, Background, Assessment and   Recommendations(SBAR). Information from the following report(s) SBAR, Intake/Output and MAR was reviewed with the receiving nurse. Lines:   Peripheral IV 05/01/19 Left; Inner Forearm (Active)   Site Assessment Clean, dry, & intact 5/1/2019  9:24 AM   Phlebitis Assessment 0 5/1/2019  9:24 AM   Infiltration Assessment 0 5/1/2019  9:24 AM   Dressing Status Clean, dry, & intact 5/1/2019  9:24 AM   Dressing Type Transparent;Tape 5/1/2019  9:24 AM   Hub Color/Line Status Pink; Infusing 5/1/2019  9:24 AM   Alcohol Cap Used No 5/1/2019  7:13 AM        Opportunity for questions and clarification was provided.       Patient transported with:   [place]

## 2019-05-01 NOTE — ANESTHESIA PREPROCEDURE EVALUATION
Relevant Problems No relevant active problems Anesthetic History PONV Review of Systems / Medical History Patient summary reviewed, nursing notes reviewed and pertinent labs reviewed Pulmonary Sleep apnea Neuro/Psych Psychiatric history Cardiovascular Hypertension GI/Hepatic/Renal 
  
GERD Endo/Other Diabetes Morbid obesity and arthritis Other Findings Physical Exam 
 
Airway Mallampati: II 
TM Distance: 4 - 6 cm Neck ROM: normal range of motion Mouth opening: Normal 
 
 Cardiovascular Regular rate and rhythm,  S1 and S2 normal,  no murmur, click, rub, or gallop Dental 
No notable dental hx Pulmonary Breath sounds clear to auscultation Abdominal 
GI exam deferred Other Findings Anesthetic Plan ASA: 3 Anesthesia type: general 
 
 
 
 
Induction: Intravenous Anesthetic plan and risks discussed with: Patient

## 2019-05-28 ENCOUNTER — OFFICE VISIT (OUTPATIENT)
Dept: ONCOLOGY | Age: 79
End: 2019-05-28

## 2019-05-28 VITALS
OXYGEN SATURATION: 96 % | HEIGHT: 62 IN | DIASTOLIC BLOOD PRESSURE: 72 MMHG | RESPIRATION RATE: 16 BRPM | TEMPERATURE: 97.1 F | BODY MASS INDEX: 45.05 KG/M2 | SYSTOLIC BLOOD PRESSURE: 141 MMHG | WEIGHT: 244.8 LBS | HEART RATE: 60 BPM

## 2019-05-28 DIAGNOSIS — C54.1 ENDOMETRIAL CANCER (HCC): Primary | ICD-10-CM

## 2019-05-28 NOTE — PROGRESS NOTES
[facility]  [address] [place] [address], [address], [address] [phone]  Chetan English DO      Patient ID:  Name: Jessica Kee  MRM: [id number]  : 1940/79 y.o. Date: 2019    SUBJECTIVE:    This is a 70 y.o.   female with a diagnosis of Stage IAG1 Endometrial Cancer following a robotic assisted TLH and BSO with cystoscopy 7/2/15. Pt is here today for a 6 month f/u. Patient denies any Gyn symptoms. Patient specifically denies any abnormal vaginal bleeding, vaginal discharge, or vaginal irritation. Patient also denies abdominal pain, pelvic pain, or abdominal bloating. Patient is voiding without difficulty and bowel movements are regular. Recently had knee surgery        Her pathology revealed: 7/2/15      Imaging:  CT 11/25/15  Impression:      1. There is a fat containing umbilical hernia measuring approximately 3.9 cm in diameter. This has a neck measuring 2.6 cm in diameter. There is no protrusion of bowel into this hernia. No other abdominal wall hernia is evident. 2. Fatty infiltration of the liver. 3. Status post cholecystectomy. 4. A nonobstructing stone is noted in the lower pole of the left renal collecting system. 5. Colonic diverticulosis without evidence of acute diverticulitis. 6. Status post hysterectomy and bilateral oophorectomies. 7. The appendix is not visualized and is also presumed surgically absent. Medications:     Current Outpatient Medications on File Prior to Visit   Medication Sig Dispense Refill    furosemide (LASIX) 40 mg tablet Take 40 mg by mouth as needed.  menthol/camphor (BIOFREEZE EX) by Apply Externally route as needed for Pain.  multivitamin (ONE A DAY) tablet Take 1 Tab by mouth daily.  amLODIPine (NORVASC) 5 mg tablet Take 5 mg by mouth nightly as needed.       indomethacin (INDOCIN) 25 mg capsule Take 25 mg by mouth three (3) times daily as needed.  metFORMIN (GLUCOPHAGE) 500 mg tablet Take 500 mg by mouth daily (with breakfast).  metFORMIN (GLUCOPHAGE) 1,000 mg tablet Take 1,000 mg by mouth daily (with dinner).  calcium-cholecalciferol, D3, (CALTRATE 600+D) tablet Take 1 Tab by mouth two (2) times a day.  polyvinyl alcohol-povidon,PF, (REFRESH CLASSIC) 1.4-0.6 % ophthalmic solution Administer 1-2 Drops to both eyes as needed.  omega 3-DHA-EPA-fish oil 1,000 mg (120 mg-180 mg) capsule Take 1 Cap by mouth daily.  aspirin delayed-release 81 mg tablet Take  by mouth as needed for Pain.  allopurinol (ZYLOPRIM) 100 mg tablet 300 mg daily. 0    glimepiride (AMARYL) 2 mg tablet Take 4 mg by mouth two (2) times a day.  0    PNV62-FA-om3-dha-epa-fish oil (PRENATAL GUMMY) 400 mcg-35 mg -25 mg-5 mg chew Take  by mouth.  VOLTAREN 1 % topical gel   0    losartan-hydrochlorothiazide (HYZAAR) 100-25 mg per tablet Take 1 Tab by mouth daily. 0    omeprazole (PRILOSEC) 20 mg capsule   0    atenolol (TENORMIN) 25 mg tablet Take 25 mg by mouth daily.  potassium chloride SR (KLOR-CON 10) 10 mEq tablet Take 20 mEq by mouth three (3) times daily.  ketoconazole (NIZORAL) 2 % shampoo Apply  to affected area daily as needed for Itching.  rivaroxaban (XARELTO) 10 mg tablet Take 1 Tab by mouth daily (with dinner). Indications: Treatment to Prevent Recurrence of a Clot in a Deep Vein 14 Tab 0     No current facility-administered medications on file prior to visit. Allergies:      Allergies   Allergen Reactions    Sulfa (Sulfonamide Antibiotics) Rash       OBJECTIVE:    Vitals:   Visit Vitals  /72 (BP 1 Location: Left arm, BP Patient Position: Sitting)   Pulse 60   Temp 97.1 °F (36.2 °C) (Oral)   Resp 16   Ht 5' 2\" (1.575 m)   Wt 111 kg (244 lb 12.8 oz)   LMP  (LMP Unknown)   SpO2 96%   BMI 44.77 kg/m²       Physical Examination:    General:  alert, cooperative, no distress   Abdomen: soft, bowel sounds active, non-tender   Incision: healing well, small bulge above umblicus   Pelvic: NEFG, Spec exam revealed vaginal cuff intact, BME revealed vaginal cuff intact, nontender   Rectal: not done   Extremity:   extremities normal, atraumatic, no cyanosis or edema     IMPRESSION/PLAN:  1. Stage IAG1 endometrial cancer, who is clinically NEREYDA   -reviewed signs/symptoms of recurrence   -reviewed surveillance strategy   -pelvic exam performed today   -will plan for f/u in  6 months   -all of patients questions/concerns addressed                  Total time spent was 25 minutes regarding diagnosis of endometrial cancer and >50% of this time was spent counseling and coordinating care.     Heavenly Baker DO  Gynecologic Oncology  5/28/2019/10:03 AM

## 2019-05-28 NOTE — PATIENT INSTRUCTIONS

## 2019-05-28 NOTE — PROGRESS NOTES
Dory Vera, a 70 y.o. female,  is here for   Chief Complaint   Patient presents with    Uterine Cancer     6 month surveillance       Visit Vitals  /72 (BP 1 Location: Left arm, BP Patient Position: Sitting)   Pulse 60   Temp 97.1 °F (36.2 °C) (Oral)   Resp 16   Ht 5' 2\" (1.575 m)   Wt 111 kg (244 lb 12.8 oz)   SpO2 96%   BMI 44.77 kg/m²       Patient denies any persistent or worsening abdominal or pelvic pain. Denies any unusual vaginal bleeding, discharge, irritation, or odor. Denies experiencing any constipation or diarrhea. No burning, discomfort, or irritation with urination. 1. Have you been to the ER, urgent care clinic since your last visit? Hospitalized since your last visit? No    2. Have you seen or consulted any other health care providers outside of the [address] [place] [address] since your last visit? Include any pap smears or colon screening.  No

## 2019-05-28 NOTE — LETTER
5/28/19 Patient: Azar Choi YOB: 1940 Date of Visit: 5/28/2019 Linda Moreland MD 
[address] [place] [address],[address] [address] VIA Facsimile: [phone] Dear Linda Moreland MD, Thank you for referring Ms. Anders Graf to [facility] for evaluation. My notes for this consultation are attached. If you have questions, please do not hesitate to call me. I look forward to following your patient along with you. Sincerely, Raul Durand MD

## 2019-11-26 ENCOUNTER — OFFICE VISIT (OUTPATIENT)
Dept: ONCOLOGY | Age: 79
End: 2019-11-26

## 2019-11-26 VITALS
OXYGEN SATURATION: 97 % | TEMPERATURE: 96.8 F | HEART RATE: 78 BPM | WEIGHT: 238.6 LBS | DIASTOLIC BLOOD PRESSURE: 95 MMHG | HEIGHT: 62 IN | BODY MASS INDEX: 43.91 KG/M2 | SYSTOLIC BLOOD PRESSURE: 179 MMHG | RESPIRATION RATE: 14 BRPM

## 2019-11-26 DIAGNOSIS — C54.1 ENDOMETRIAL CANCER (HCC): Primary | ICD-10-CM

## 2019-11-26 NOTE — PROGRESS NOTES
[facility]  [address] [place] [address], [address], [address] [address] [phone]  Joseanne Bailey       Patient ID:  Name: Alber Goodman  MRM: [id number]  : 1940/79 y.o. Date: 2019    SUBJECTIVE:    This is a 70 y.o.   female with a diagnosis of Stage IAG1 Endometrial Cancer following a robotic assisted TLH and BSO with cystoscopy 7/2/15. Pt is here today for a 6 month f/u. Patient denies any Gyn symptoms. Patient specifically denies any abnormal vaginal bleeding, vaginal discharge, or vaginal irritation. Patient also denies abdominal pain, pelvic pain, or abdominal bloating. Patient is voiding without difficulty and bowel movements are regular. Was recently admitted with bladder infection for 3 days. Feeling better now. Her pathology revealed: 7/2/15      Imaging:  CT 11/25/15  Impression:      1. There is a fat containing umbilical hernia measuring approximately 3.9 cm in diameter. This has a neck measuring 2.6 cm in diameter. There is no protrusion of bowel into this hernia. No other abdominal wall hernia is evident. 2. Fatty infiltration of the liver. 3. Status post cholecystectomy. 4. A nonobstructing stone is noted in the lower pole of the left renal collecting system. 5. Colonic diverticulosis without evidence of acute diverticulitis. 6. Status post hysterectomy and bilateral oophorectomies. 7. The appendix is not visualized and is also presumed surgically absent. Medications:     Current Outpatient Medications on File Prior to Visit   Medication Sig Dispense Refill    calcium-cholecalciferol, D3, (CALTRATE 600+D) tablet Take 1 Tab by mouth two (2) times a day.  polyvinyl alcohol-povidon,PF, (REFRESH CLASSIC) 1.4-0.6 % ophthalmic solution Administer 1-2 Drops to both eyes as needed.  aspirin delayed-release 81 mg tablet Take  by mouth as needed for Pain.       allopurinol (ZYLOPRIM) 100 mg tablet 300 mg daily. 0    glimepiride (AMARYL) 2 mg tablet Take 4 mg by mouth two (2) times a day.  0    VOLTAREN 1 % topical gel   0    omeprazole (PRILOSEC) 20 mg capsule   0    atenolol (TENORMIN) 25 mg tablet Take 25 mg by mouth daily.  ketoconazole (NIZORAL) 2 % shampoo Apply  to affected area daily as needed for Itching.  rivaroxaban (XARELTO) 10 mg tablet Take 1 Tab by mouth daily (with dinner). Indications: Treatment to Prevent Recurrence of a Clot in a Deep Vein 14 Tab 0    furosemide (LASIX) 40 mg tablet Take 40 mg by mouth as needed.  menthol/camphor (BIOFREEZE EX) by Apply Externally route as needed for Pain.  multivitamin (ONE A DAY) tablet Take 1 Tab by mouth daily.  amLODIPine (NORVASC) 5 mg tablet Take 5 mg by mouth nightly as needed.  indomethacin (INDOCIN) 25 mg capsule Take 25 mg by mouth three (3) times daily as needed.  metFORMIN (GLUCOPHAGE) 500 mg tablet Take 500 mg by mouth daily (with breakfast).  metFORMIN (GLUCOPHAGE) 1,000 mg tablet Take 1,000 mg by mouth daily (with dinner).  omega 3-DHA-EPA-fish oil 1,000 mg (120 mg-180 mg) capsule Take 1 Cap by mouth daily.  PNV62-FA-om3-dha-epa-fish oil (PRENATAL GUMMY) 400 mcg-35 mg -25 mg-5 mg chew Take  by mouth.  losartan-hydrochlorothiazide (HYZAAR) 100-25 mg per tablet Take 1 Tab by mouth daily. 0    potassium chloride SR (KLOR-CON 10) 10 mEq tablet Take 20 mEq by mouth three (3) times daily. No current facility-administered medications on file prior to visit. Allergies:      Allergies   Allergen Reactions    Sulfa (Sulfonamide Antibiotics) Rash       OBJECTIVE:    Vitals:   Visit Vitals  BP (!) 179/95 (BP 1 Location: Left arm, BP Patient Position: Sitting)   Pulse 78   Temp 96.8 °F (36 °C) (Oral)   Resp 14   Ht 5' 2\" (1.575 m)   Wt 108.2 kg (238 lb 9.6 oz)   LMP  (LMP Unknown)   SpO2 97%   BMI 43.64 kg/m²       Physical Examination:    General:  alert, cooperative, no distress   Abdomen: soft, bowel sounds active, non-tender   Incision: healing well, small bulge above umblicus   Pelvic: NEFG, Spec exam revealed vaginal cuff intact, BME revealed vaginal cuff intact, nontender   Rectal: not done   Extremity:   extremities normal, atraumatic, no cyanosis or edema     IMPRESSION/PLAN:  1. Stage IAG1 endometrial cancer, who is clinically NEREYDA   -reviewed signs/symptoms of recurrence   -reviewed surveillance strategy   -pelvic exam performed today   -will plan for f/u in  6 months   -all of patients questions/concerns addressed                  Total time spent was 25 minutes regarding diagnosis of endometrial cancer and >50% of this time was spent counseling and coordinating care.     Duy Chatterjee DO  Gynecologic Oncology  11/26/2019/10:03 AM

## 2019-11-26 NOTE — LETTER
11/26/19 Patient: Alber Goodman YOB: 1940 Date of Visit: 11/26/2019 Jaleel Graf MD 
[address] [place] [address],[address] [id number] VIA Facsimile: [phone] Dear Jaleel Graf MD, Thank you for [facility]. My notes for this consultation are attached. If you have questions, please do not hesitate to call me. I look forward to following your patient along with you. Sincerely, Heidi Domínguez MD

## 2019-11-26 NOTE — PATIENT INSTRUCTIONS
Uterine Cancer: Care Instructions Your Care Instructions Uterine cancer is the rapid growth of abnormal cells that line the uterus. It also is called endometrial cancer. These cells may spread to nearby organs, lymph glands, or distant organs. Uterine cancer can be cured most often when found early. Treatment may include surgery to remove the uterus, ovaries, fallopian tubes, and sometimes the pelvic lymph nodes. Radiation and hormones to stop cancer growth also are sometimes used. Chemotherapy may be used if the cancer has spread. Having cancer can be scary. You may feel many emotions and may need some help coping. Seek out family, friends, and counselors for support. You can do things at home to make yourself feel better while you go through treatment. You also can call the [employer] (3-459.722.5751) or visit its website at [employer] for more information. Follow-up care is a key part of your treatment and safety. Be sure to make and go to all appointments, and call your doctor if you are having problems. It's also a good idea to know your test results and keep a list of the medicines you take. How can you care for yourself at home? · Take your medicines exactly as prescribed. Call your doctor if you think you are having a problem with your medicine. You may get medicine for nausea and vomiting if you have these side effects. · Eat healthy food. If you do not feel like eating, try to eat food that has protein and extra calories to keep up your strength and prevent weight loss. Drink liquid meal replacements for extra calories and protein. Try to eat your main meal early. · Get some physical activity every day, but do not get too tired. Keep doing the hobbies you enjoy as your energy allows. · Take steps to control your stress and workload. Learn relaxation techniques. ? Share your feelings. Stress and tension affect our emotions.  By expressing your feelings to others, you may be able to understand and cope with them. ? Consider joining a support group. Talking about a problem with your spouse, a good friend, or other people with similar problems is a good way to reduce tension and stress. ? Express yourself through art. Try writing, dance, art, or crafts to relieve tension. Some dance, writing, or art groups may be available just for people who have cancer. ? Be kind to your body and mind. Getting enough sleep, eating a healthy diet, and taking time to do things you enjoy can contribute to an overall feeling of balance in your life and help reduce stress. ? Get help if you need it. Discuss your concerns with your doctor or counselor. · If you are vomiting or have diarrhea: ? Drink plenty of fluids (enough so that your urine is light yellow or clear like water) to prevent dehydration. Choose water and other caffeine-free clear liquids. If you have kidney, heart, or liver disease and have to limit fluids, talk with your doctor before you increase the amount of fluids you drink. ? When you are able to eat, try clear soups, mild foods, and liquids until all symptoms are gone for 12 to 48 hours. Other good choices include dry toast, crackers, cooked cereal, and gelatin dessert, such as Jell-O. 
· Take care of your urinary tract to prevent problems such as infection, which can be caused by uterine cancer and its treatment. Limit drinks with caffeine, drink plenty of fluids, and urinate every 3 to 4 hours. · If you have not already done so, prepare a list of advance directives. Advance directives are instructions to your doctor and family members about what kind of care you want if you become unable to speak or express yourself. When should you call for help? Call 911 anytime you think you may need emergency care. For example, call if: 
  · You passed out (lost consciousness).  
 Call your doctor now or seek immediate medical care if:   · You have a fever.  
  · You have abnormal bleeding.  
  · You have new or worse pain.  
  · You think you have an infection.  
  · You have new symptoms, such as a cough, belly pain, vomiting, diarrhea, or a rash.  
 Watch closely for changes in your health, and be sure to contact your doctor if: 
  · You are much more tired than usual.  
  · You have swollen glands in your armpits, groin, or neck.  
  · You do not get better as expected. Where can you learn more? Go to http://laura-krystina.info/. Enter E343 in the search box to learn more about \"Uterine Cancer: Care Instructions. \" Current as of: December 19, 2018 Content Version: 12.2 © 2236-6880 [employer], [employer]. Care instructions adapted under license by [employer] (which disclaims liability or warranty for this information). If you have questions about a medical condition or this instruction, always ask your healthcare professional. [address] any warranty or liability for your use of this information.

## 2019-11-26 NOTE — PROGRESS NOTES
Alfred Plascencia is a 70 y.o. female presents in office for endometrial cancer follow up. Chief Complaint   Patient presents with    Cancer     Endometrial Cancer        Do you have any unusual vaginal bleeding, discharge or irritation? Pt c/o vaginal itching. Do you have any changes in your bowel movements? No  Have you been experiencing nausea or vomiting? No  Have you been experiencing any continuous or worsening abdominal pain? No  Any urinary burning? No    Visit Vitals  BP (!) 179/95 (BP 1 Location: Left arm, BP Patient Position: Sitting)   Pulse 78   Temp 96.8 °F (36 °C) (Oral)   Resp 14   Ht 5' 2\" (1.575 m)   Wt 108.2 kg (238 lb 9.6 oz)   SpO2 97%   BMI 43.64 kg/m²         Health Maintenance Due   Topic Date Due    LIPID PANEL Q1  1940    FOOT EXAM Q1  03/08/1952    MICROALBUMIN Q1  03/08/1952    EYE EXAM RETINAL OR DILATED  03/08/1952    DTaP/Tdap/Td series (1 - Tdap) 03/08/1953    Shingrix Vaccine Age 50> (1 of 2) 03/08/1992    GLAUCOMA SCREENING Q2Y  03/08/2007    Bone Densitometry (Dexa) Screening  03/08/2007    Pneumococcal 65+ years (2 of 2 - PPSV23) 03/08/2007    MEDICARE YEARLY EXAM  04/03/2019    Influenza Age 9 to Adult  08/01/2019    HEMOGLOBIN A1C Q6M  10/11/2019         1. Have you been to the ER, urgent care clinic since your last visit? Hospitalized since your last visit? Yes, pt was seen in ED beginning of November for UTI, bladder infection; hospitalized [address]. 2. Have you seen or consulted any other health care providers outside of the [address] [place] [address] since your last visit? Include any pap smears or colon screening.  Coloscopy during hospitalization Nov. 2019    3 most recent PHQ Screens 5/28/2019   Little interest or pleasure in doing things Several days   Feeling down, depressed, irritable, or hopeless Several days   Total Score PHQ 2 2       Abuse Screening Questionnaire 5/28/2019   Do you ever feel afraid of your partner? N   Are you in a relationship with someone who physically or mentally threatens you? N   Is it safe for you to go home? Y       Fall Risk Assessment, last 12 mths 5/28/2019   Able to walk? Yes   Fall in past 12 months? Yes   Fall with injury?  No   Number of falls in past 12 months 1   Fall Risk Score 1       Learning Assessment 11/28/2017   PRIMARY LEARNER Patient   HIGHEST LEVEL OF EDUCATION - PRIMARY LEARNER  SOME COLLEGE   BARRIERS PRIMARY LEARNER NONE   CO-LEARNER CAREGIVER No   PRIMARY LANGUAGE ENGLISH   LEARNER PREFERENCE PRIMARY DEMONSTRATION   ANSWERED BY patient   RELATIONSHIP SELF

## 2020-05-04 ENCOUNTER — TELEPHONE (OUTPATIENT)
Dept: ONCOLOGY | Age: 80
End: 2020-05-04

## 2020-05-04 NOTE — TELEPHONE ENCOUNTER
Left voicemail for patient to contact the office to triage for upcoming appointment on 5/26/2020 and possibly reschedule appointment to June due to COVID 19.

## 2020-06-23 ENCOUNTER — OFFICE VISIT (OUTPATIENT)
Dept: ONCOLOGY | Age: 80
End: 2020-06-23

## 2020-06-23 VITALS
BODY MASS INDEX: 44.57 KG/M2 | OXYGEN SATURATION: 96 % | SYSTOLIC BLOOD PRESSURE: 177 MMHG | WEIGHT: 242.2 LBS | TEMPERATURE: 97.1 F | RESPIRATION RATE: 16 BRPM | DIASTOLIC BLOOD PRESSURE: 81 MMHG | HEART RATE: 65 BPM | HEIGHT: 62 IN

## 2020-06-23 DIAGNOSIS — R35.0 URINARY FREQUENCY: ICD-10-CM

## 2020-06-23 DIAGNOSIS — C54.1 ENDOMETRIAL CANCER (HCC): Primary | ICD-10-CM

## 2020-06-23 DIAGNOSIS — R10.2 PELVIC PRESSURE IN FEMALE: ICD-10-CM

## 2020-06-23 LAB
BILIRUB UR QL STRIP: NEGATIVE
GLUCOSE UR-MCNC: NEGATIVE MG/DL
KETONES P FAST UR STRIP-MCNC: NEGATIVE MG/DL
PH UR STRIP: 5.5 [PH] (ref 4.6–8)
PROT UR QL STRIP: NEGATIVE
SP GR UR STRIP: 1.02 (ref 1–1.03)
UA UROBILINOGEN AMB POC: NORMAL (ref 0.2–1)
URINALYSIS CLARITY POC: CLEAR
URINALYSIS COLOR POC: YELLOW
URINE BLOOD POC: NORMAL
URINE LEUKOCYTES POC: NORMAL
URINE NITRITES POC: POSITIVE

## 2020-06-23 RX ORDER — NITROFURANTOIN 25; 75 MG/1; MG/1
100 CAPSULE ORAL 2 TIMES DAILY
Qty: 10 CAP | Refills: 0 | Status: SHIPPED | OUTPATIENT
Start: 2020-06-23 | End: 2020-06-23 | Stop reason: SDUPTHER

## 2020-06-23 RX ORDER — NITROFURANTOIN 25; 75 MG/1; MG/1
100 CAPSULE ORAL 2 TIMES DAILY
Qty: 10 CAP | Refills: 0 | Status: SHIPPED | OUTPATIENT
Start: 2020-06-23 | End: 2020-06-28

## 2020-06-23 RX ORDER — TOLTERODINE 4 MG/1
CAPSULE, EXTENDED RELEASE ORAL
COMMUNITY
Start: 2020-04-07

## 2020-06-23 NOTE — PROGRESS NOTES
Enriquebelkisivan Sun is a 68 y.o. female presents in office for 6 month follow-up     Patient states not currently having any pain. Patient states several weeks ago feels like she is starting a period but no bleeding. Patient states has no discharge. Do you have any unusual vaginal bleeding, discharge or irritation? No     Do you have any changes in your bowel movements? No      Have you been experiencing any continuous or worsening abdominal pain? No     .  Visit Vitals  /81 (BP 1 Location: Right arm, BP Patient Position: Sitting)   Pulse 65   Temp 97.1 °F (36.2 °C) (Oral)   Resp 16   Ht 5' 2\" (1.575 m)   Wt 242 lb 3.2 oz (109.9 kg)   SpO2 96%   BMI 44.30 kg/m²         Health Maintenance Due   Topic Date Due    Foot Exam Q1  03/08/1950    MICROALBUMIN Q1  03/08/1952    Eye Exam Retinal or Dilated  03/08/1952    DTaP/Tdap/Td series (1 - Tdap) 03/08/1963    Shingrix Vaccine Age 50> (1 of 2) 03/08/1992    GLAUCOMA SCREENING Q2Y  03/08/2007    Bone Densitometry (Dexa) Screening  03/08/2007    Pneumococcal 65+ years (2 of 2 - PPSV23) 03/08/2007    Medicare Yearly Exam  04/03/2019         1. Have you been to the ER, urgent care clinic since your last visit? Hospitalized since your last visit? No     2. Have you seen or consulted any other health care providers outside of the [address] [place] [address] since your last visit? Include any pap smears or colon screening. No     3 most recent PHQ Screens 5/28/2019   Little interest or pleasure in doing things Several days   Feeling down, depressed, irritable, or hopeless Several days   Total Score PHQ 2 2       Abuse Screening Questionnaire 5/28/2019   Do you ever feel afraid of your partner? N   Are you in a relationship with someone who physically or mentally threatens you? N   Is it safe for you to go home? Y       Fall Risk Assessment, last 12 mths 5/28/2019   Able to walk? Yes   Fall in past 12 months? Yes   Fall with injury?  No   Number of falls in past 12 months 1   Fall Risk Score 1       Learning Assessment 11/28/2017   PRIMARY LEARNER Patient   HIGHEST LEVEL OF EDUCATION - PRIMARY LEARNER  SOME COLLEGE   BARRIERS PRIMARY LEARNER NONE   CO-LEARNER CAREGIVER No   PRIMARY LANGUAGE ENGLISH   LEARNER PREFERENCE PRIMARY DEMONSTRATION   ANSWERED BY patient   RELATIONSHIP SELF

## 2020-06-23 NOTE — PROGRESS NOTES
[facility]  [address] [place] [address], [address], [address] [id number] [phone]  [facility]        Patient ID:  Name: Kylee Nice  MRM: [id number]  : 1940/80 y.o. Date: 2020    SUBJECTIVE:    This is a 68 y.o.   female with a diagnosis of Stage IAG1 Endometrial Cancer following a robotic assisted TLH and BSO with cystoscopy 7/2/15. Pt is here today for a 6 month f/u. Patient denies any Gyn symptoms. Patient specifically denies any abnormal vaginal bleeding, vaginal discharge, or vaginal irritation. Patient also denies abdominal pain, pelvic pain, or abdominal bloating. Patient is voiding without difficulty and bowel movements are regular. Patient reports recent intermittent lower pelvic pressure. She has h/o bladder infections. Admits to urinary frequency. Denies dysuria, hematuria, back pain, fever or other symptoms. Her pathology revealed: 7/2/15      Imaging:  CT 11/25/15  Impression:      1. There is a fat containing umbilical hernia measuring approximately 3.9 cm in diameter. This has a neck measuring 2.6 cm in diameter. There is no protrusion of bowel into this hernia. No other abdominal wall hernia is evident. 2. Fatty infiltration of the liver. 3. Status post cholecystectomy. 4. A nonobstructing stone is noted in the lower pole of the left renal collecting system. 5. Colonic diverticulosis without evidence of acute diverticulitis. 6. Status post hysterectomy and bilateral oophorectomies. 7. The appendix is not visualized and is also presumed surgically absent. Medications:     Current Outpatient Medications on File Prior to Visit   Medication Sig Dispense Refill    rivaroxaban (XARELTO) 10 mg tablet Take 1 Tab by mouth daily (with dinner).  Indications: Treatment to Prevent Recurrence of a Clot in a Deep Vein 14 Tab 0    furosemide (LASIX) 40 mg tablet Take 40 mg by mouth as needed.  menthol/camphor (BIOFREEZE EX) by Apply Externally route as needed for Pain.  multivitamin (ONE A DAY) tablet Take 1 Tab by mouth daily.  amLODIPine (NORVASC) 5 mg tablet Take 5 mg by mouth nightly as needed.  indomethacin (INDOCIN) 25 mg capsule Take 25 mg by mouth three (3) times daily as needed.  metFORMIN (GLUCOPHAGE) 500 mg tablet Take 500 mg by mouth daily (with breakfast).  metFORMIN (GLUCOPHAGE) 1,000 mg tablet Take 1,000 mg by mouth daily (with dinner).  calcium-cholecalciferol, D3, (CALTRATE 600+D) tablet Take 1 Tab by mouth two (2) times a day.  polyvinyl alcohol-povidon,PF, (REFRESH CLASSIC) 1.4-0.6 % ophthalmic solution Administer 1-2 Drops to both eyes as needed.  omega 3-DHA-EPA-fish oil 1,000 mg (120 mg-180 mg) capsule Take 1 Cap by mouth daily.  aspirin delayed-release 81 mg tablet Take  by mouth as needed for Pain.  allopurinol (ZYLOPRIM) 100 mg tablet 300 mg daily. 0    glimepiride (AMARYL) 2 mg tablet Take 4 mg by mouth two (2) times a day.  0    PNV62-FA-om3-dha-epa-fish oil (PRENATAL GUMMY) 400 mcg-35 mg -25 mg-5 mg chew Take  by mouth.  VOLTAREN 1 % topical gel   0    losartan-hydrochlorothiazide (HYZAAR) 100-25 mg per tablet Take 1 Tab by mouth daily. 0    omeprazole (PRILOSEC) 20 mg capsule   0    atenolol (TENORMIN) 25 mg tablet Take 25 mg by mouth daily.  potassium chloride SR (KLOR-CON 10) 10 mEq tablet Take 20 mEq by mouth three (3) times daily.  ketoconazole (NIZORAL) 2 % shampoo Apply  to affected area daily as needed for Itching. No current facility-administered medications on file prior to visit. Allergies:      Allergies   Allergen Reactions    Sulfa (Sulfonamide Antibiotics) Rash       OBJECTIVE:    Vitals:   Visit Vitals  LMP  (LMP Unknown)       Physical Examination:    General:  alert, cooperative, no distress   Abdomen: soft, bowel sounds active, non-tender   Incision: healing well, small bulge above umblicus   Pelvic: NEFG, Spec exam revealed vaginal cuff intact, BME revealed vaginal cuff intact, nontender    Rectal: not done   Extremity:   extremities normal, atraumatic, no cyanosis or edema     IMPRESSION/PLAN:  1. Stage IAG1 endometrial cancer, who is clinically NEREYDA   -reviewed signs/symptoms of recurrence   -reviewed surveillance strategy   -pelvic exam performed today   -will plan for f/u in one year   -all of patients questions/concerns addressed  2. Lower pelvic pressure   -POC UA +, will treat empirically and adjust per culture      Total time spent was 25 minutes regarding diagnosis of endometrial cancer and >50% of this time was spent counseling and coordinating care.     Aidee Peace [facility]  Gynecologic Oncology  6/23/2020/10:03 AM

## (undated) DEVICE — 3M™ STERI-DRAPE™ INSTRUMENT POUCH 1018: Brand: STERI-DRAPE™

## (undated) DEVICE — GAUZE SPONGES,12 PLY: Brand: CURITY

## (undated) DEVICE — DEVON™ KNEE AND BODY STRAP 60" X 3" (1.5 M X 7.6 CM): Brand: DEVON

## (undated) DEVICE — T5 HOOD WITH PEEL AWAY FACE SHIELD

## (undated) DEVICE — GARMENT,MEDLINE,DVT,INT,CALF,MED, GEN2: Brand: MEDLINE

## (undated) DEVICE — STERILE POLYISOPRENE POWDER-FREE SURGICAL GLOVES WITH EMOLLIENT COATING: Brand: PROTEXIS

## (undated) DEVICE — ADHESIVE SKIN CLOSURE 4X22 CM PREMIERPRO EXOFINFUSION DISP

## (undated) DEVICE — SPLINT KNEE UNIV FOR LESS THAN 36IN L20IN FOAM LAM E CNTCT

## (undated) DEVICE — HANDPIECE SET WITH FAN SPRAY TIP: Brand: INTERPULSE

## (undated) DEVICE — STERILE MEDICINE CUP 2 OZ KIT: Brand: CARDINAL HEALTH

## (undated) DEVICE — DRESSING FOAM SELF ADH 20X10 CM ABSORBENT MEPILEX BORDER

## (undated) DEVICE — STERILE POLYISOPRENE POWDER-FREE SURGICAL GLOVES: Brand: PROTEXIS

## (undated) DEVICE — 4.5 MM INCISOR PLUS STRAIGHT                                    BLADES, POWER/EP-1, VIOLET, PACKAGED                                    6 PER BOX, STERILE

## (undated) DEVICE — PACK PROCEDURE SURG TOT SHLDR CUST

## (undated) DEVICE — ABDOMINAL PAD: Brand: DERMACEA

## (undated) DEVICE — SOLUTION IRRIG 3000ML LAC R FLX CONT

## (undated) DEVICE — SYR IRR CATH TIP LR ADPT 70ML -- CONVERT TO ITEM 363120

## (undated) DEVICE — SOLUTION IV 500ML 0.9% SOD CHL FLX CONT

## (undated) DEVICE — TUBING PMP L8FT LNG W/ CONN FOR AR-6400 REDEUCE

## (undated) DEVICE — STERILE TETRA-FLEX CF LF, 6IN X 11 YD: Brand: TETRA-FLEX™ CF

## (undated) DEVICE — MAYO STAND COVER: Brand: CONVERTORS

## (undated) DEVICE — SUTURE FIBERWIRE SZ 2 W/ TAPERED NEEDLE BLUE L38IN NONABSORB BLU L26.5MM 1/2 CIRCLE AR7200

## (undated) DEVICE — T4 ZIPPER TOGA, (L/XL)

## (undated) DEVICE — WRAP THER CLD H2O KNEE UNIV FREEZABLE W VELC STRP REUSE

## (undated) DEVICE — SYR 10ML CTRL LR LCK NSAF LF --

## (undated) DEVICE — ELECTRODE RF DIA4MM 90DEG SUCT W/ INTEGR HNDPC VAPR S90

## (undated) DEVICE — PACK PROCEDURE SURG KNEE ARTHSCP CUST

## (undated) DEVICE — 3.5 MM INCISOR STRAIGHT BLADES,                                    POWER/EP-1, MEDIUM GRAY, PACKAGED 6                                    PER BOX, STERILE

## (undated) DEVICE — STRYKER PERFORMANCE SERIES SAGITTAL BLADE: Brand: STRYKER PERFORMANCE SERIES

## (undated) DEVICE — BLADE RMFG SAG HD 25X79.5X1MM --

## (undated) DEVICE — PREP CHLORAPREP 10.5 ML ORG --

## (undated) DEVICE — SUT VCRL + 3-0 27IN CT2 UD --

## (undated) DEVICE — TUBE IRRIG L8IN LNG PT W/ CONN FOR PMP SYS REDEUCE

## (undated) DEVICE — GOWN,SIRUS,NONRNF,SETINSLV,XL,20/CS: Brand: MEDLINE

## (undated) DEVICE — SUT ETHBND 5 30IN CCS MP GRN --

## (undated) DEVICE — Device

## (undated) DEVICE — SUT ETHBND 1 30IN OS8 GRN --

## (undated) DEVICE — SUT VCRL + 0 36IN CT1 UD --

## (undated) DEVICE — GOWN,SIRUS,NONRNF,SETINSLV,2XL,18/CS: Brand: MEDLINE

## (undated) DEVICE — BIT DRL SCR 3.2MM CNTRL DISP --

## (undated) DEVICE — 3M™ STERI-DRAPE™ U-DRAPE 1015: Brand: STERI-DRAPE™

## (undated) DEVICE — (D)STRIP SKN CLSR 0.5X4IN WHT --

## (undated) DEVICE — LEGGINGS, PAIR, 31X48, STERILE: Brand: MEDLINE

## (undated) DEVICE — BIT DRL SCR PERIPH 2.7MM DISP --

## (undated) DEVICE — KENDALL SCD EXPRESS SLEEVES, KNEE LENGTH, MEDIUM: Brand: KENDALL SCD

## (undated) DEVICE — SINGLE PORT MANIFOLD: Brand: NEPTUNE 2

## (undated) DEVICE — SUT MCRYL + 3-0 27IN PS1 UD --

## (undated) DEVICE — NEEDLE HYPO 18GA L1.5IN PNK POLYPR HUB S STL THN WALL FILL

## (undated) DEVICE — (D)PREP SKN CHLRAPRP APPL 26ML -- CONVERT TO ITEM 371833

## (undated) DEVICE — SUT ETHLN 3-0 18IN PS2 BLK --

## (undated) DEVICE — LINER GLV L R FULL FNGR KEVLAR LYCRA CUT RESIST PWD FREE ST

## (undated) DEVICE — NEEDLE HYPO 21GA L1.5IN GRN POLYPR HUB S STL REG BVL STR

## (undated) DEVICE — DRAIN KT WND 10FR RND 400ML --

## (undated) DEVICE — REM POLYHESIVE ADULT PATIENT RETURN ELECTRODE: Brand: VALLEYLAB